# Patient Record
Sex: MALE | Race: WHITE | Employment: OTHER | URBAN - METROPOLITAN AREA
[De-identification: names, ages, dates, MRNs, and addresses within clinical notes are randomized per-mention and may not be internally consistent; named-entity substitution may affect disease eponyms.]

---

## 2017-02-13 ENCOUNTER — PATIENT MESSAGE (OUTPATIENT)
Dept: INTERNAL MEDICINE CLINIC | Facility: CLINIC | Age: 72
End: 2017-02-13

## 2017-02-13 RX ORDER — ESOMEPRAZOLE MAGNESIUM 40 MG/1
40 CAPSULE, DELAYED RELEASE ORAL
Qty: 90 CAPSULE | Refills: 3 | OUTPATIENT
Start: 2017-02-13

## 2017-02-14 NOTE — TELEPHONE ENCOUNTER
From: Autumn Freitas  To: Wallace Neff MD  Sent: 2/13/2017 9:54 PM CST  Subject: Prescription Question    As of January 1, 2017 I have a new prescription drug provider: [facility].  After calling them, I was told that in order to refill my prescrip

## 2017-02-15 ENCOUNTER — TELEPHONE (OUTPATIENT)
Dept: INTERNAL MEDICINE CLINIC | Facility: CLINIC | Age: 72
End: 2017-02-15

## 2017-02-15 RX ORDER — ESOMEPRAZOLE MAGNESIUM 40 MG/1
40 CAPSULE, DELAYED RELEASE ORAL
Qty: 90 CAPSULE | Refills: 3 | Status: SHIPPED | OUTPATIENT
Start: 2017-02-15 | End: 2018-01-22

## 2017-02-15 NOTE — TELEPHONE ENCOUNTER
Requests letter from Dr Maira Cordoba stating two tests - sleep study & pulmonary function  - were not mandatory   Patient is applying for life insurance & insurance company states this letter is needed to process his application   Wife Dalene Scheuermann can be reached a

## 2017-02-15 NOTE — TELEPHONE ENCOUNTER
From: Corrinne Pear  To: Soheila Willis MD  Sent: 2/14/2017 2:09 PM CST  Subject: Medication Renewal Request    Original authorizing provider: Ranny Dike, MD Corrinne Pear would like a refill of the following medications:  Amber José

## 2017-02-24 ENCOUNTER — TELEPHONE (OUTPATIENT)
Dept: INTERNAL MEDICINE CLINIC | Facility: CLINIC | Age: 72
End: 2017-02-24

## 2017-02-24 RX ORDER — PERINDOPRIL ERBUMINE 4 MG/1
4 TABLET ORAL DAILY
Qty: 90 TABLET | Refills: 3 | Status: SHIPPED | OUTPATIENT
Start: 2017-02-24 | End: 2018-01-22

## 2017-02-24 RX ORDER — ZOLPIDEM TARTRATE 10 MG/1
10 TABLET ORAL NIGHTLY
Qty: 90 TABLET | Refills: 3 | Status: SHIPPED
Start: 2017-02-24 | End: 2017-08-16

## 2017-02-24 RX ORDER — AMLODIPINE BESYLATE 10 MG/1
10 TABLET ORAL DAILY
Qty: 90 TABLET | Refills: 3 | Status: SHIPPED | OUTPATIENT
Start: 2017-02-24 | End: 2018-03-05

## 2017-02-24 NOTE — TELEPHONE ENCOUNTER
Pt called; letter completed and mailed to pt; refilled zolpidem 10 mg po qHS #90, 3RF; Rx FAXed to [address] [facility]

## 2017-02-24 NOTE — TELEPHONE ENCOUNTER
Patient's spouse asking for refills     Patient has changed insurance mail order and nds new scripts     Pt's chart has been changed     [facility] and is asking 90 day supply with one year of refills for     zolipidem 10mg one every night at

## 2017-02-24 NOTE — TELEPHONE ENCOUNTER
Daniela spouse calling asking for Dr. Cooper Holter to call her to discuss     Patient and a life insurance request - they are hoping doctor will call and discuss     They have received a call to ask doctor certain questions and pt states been waiting for a call

## 2017-02-24 NOTE — TELEPHONE ENCOUNTER
Amlodipine and Peridonpril Rx sent to [facility]  To Dr. Santos Cohen for Zolpidem Rx

## 2017-03-24 RX ORDER — AMLODIPINE BESYLATE 10 MG/1
10 TABLET ORAL DAILY
Qty: 90 TABLET | Refills: 3 | Status: CANCELLED | OUTPATIENT
Start: 2017-03-24

## 2017-03-24 RX ORDER — TAMSULOSIN HYDROCHLORIDE 0.4 MG/1
0.4 CAPSULE ORAL DAILY
Qty: 90 CAPSULE | Refills: 3 | Status: SHIPPED | OUTPATIENT
Start: 2017-03-24 | End: 2017-08-16

## 2017-03-24 NOTE — TELEPHONE ENCOUNTER
Tamsulosin refill sent. The Amlodipine refilled for 1 year 2/24/17 at [facility].

## 2017-03-24 NOTE — TELEPHONE ENCOUNTER
From: Sergei Fernandez  To: Dima Forte MD  Sent: 3/24/2017 11:37 AM CDT  Subject: Medication Renewal Request    Original authorizing provider: MD Sergei Henry would like a refill of the following medications:  tamsulo

## 2017-05-01 ENCOUNTER — TELEPHONE (OUTPATIENT)
Dept: INTERNAL MEDICINE CLINIC | Facility: CLINIC | Age: 72
End: 2017-05-01

## 2017-05-01 NOTE — TELEPHONE ENCOUNTER
Message received via [address]; d/w wife; PFTs mailed as requested    ---------------------------   ----- Message from Benson Level to Demetria Arana MD sent at 4/24/2017  9:13 AM -----   Govind Mccoy,     Would you be so kind as to ask Dr. Elisha Hahn t

## 2017-07-23 ENCOUNTER — PATIENT MESSAGE (OUTPATIENT)
Dept: INTERNAL MEDICINE CLINIC | Facility: CLINIC | Age: 72
End: 2017-07-23

## 2017-07-24 NOTE — TELEPHONE ENCOUNTER
From: Perfecto Frank  To: Irene Acosta MD  Sent: 7/23/2017 8:34 PM CDT  Subject: Emma Bui,    Sorry to bother you again, but I have a request. I am applying for a [address] from [employer] ([phone]

## 2017-08-16 ENCOUNTER — OFFICE VISIT (OUTPATIENT)
Dept: INTERNAL MEDICINE CLINIC | Facility: CLINIC | Age: 72
End: 2017-08-16

## 2017-08-16 VITALS
DIASTOLIC BLOOD PRESSURE: 80 MMHG | TEMPERATURE: 98 F | HEIGHT: 68 IN | OXYGEN SATURATION: 97 % | HEART RATE: 64 BPM | SYSTOLIC BLOOD PRESSURE: 130 MMHG | WEIGHT: 220 LBS | BODY MASS INDEX: 33.34 KG/M2

## 2017-08-16 DIAGNOSIS — R06.00 DYSPNEA, UNSPECIFIED TYPE: Primary | ICD-10-CM

## 2017-08-16 DIAGNOSIS — K21.9 GASTROESOPHAGEAL REFLUX DISEASE WITHOUT ESOPHAGITIS: ICD-10-CM

## 2017-08-16 DIAGNOSIS — I10 ESSENTIAL HYPERTENSION WITH GOAL BLOOD PRESSURE LESS THAN 140/90: ICD-10-CM

## 2017-08-16 PROCEDURE — 99214 OFFICE O/P EST MOD 30 MIN: CPT | Performed by: INTERNAL MEDICINE

## 2017-08-16 PROCEDURE — G0463 HOSPITAL OUTPT CLINIC VISIT: HCPCS | Performed by: INTERNAL MEDICINE

## 2017-08-16 RX ORDER — ALBUTEROL SULFATE 90 UG/1
2 AEROSOL, METERED RESPIRATORY (INHALATION) EVERY 6 HOURS PRN
Qty: 3 INHALER | Refills: 3 | Status: SHIPPED | OUTPATIENT
Start: 2017-08-16 | End: 2018-03-05

## 2017-08-16 RX ORDER — BUDESONIDE AND FORMOTEROL FUMARATE DIHYDRATE 80; 4.5 UG/1; UG/1
2 AEROSOL RESPIRATORY (INHALATION) 2 TIMES DAILY
Qty: 3 INHALER | Refills: 3 | Status: SHIPPED | OUTPATIENT
Start: 2017-08-16 | End: 2018-03-05

## 2017-08-16 NOTE — PROGRESS NOTES
Larry Gallegos is a 71year old male. HPI:   Patient presents with:   Follow - Up: brought blood work from August,  Shortness Of Breath: with exertion - for about 3-4 months      70 y/o M with COPD based on PFTs showing impaired small airway disease Oral Tab Take 1 tablet (10 mg total) by mouth daily as needed for Erectile Dysfunction. Disp: 24 tablet Rfl: 3   ibuprofen (MOTRIN) 200 MG Oral Tab Take 200 mg by mouth every 6 (six) hours as needed for Pain.  Disp:  Rfl:    Multiple Vitamins-Minerals (MENS somnolence  +snoring; +unrefreshing sleep; +daytime hypersomnolence; sleep study ordered, though npt pursued by pt to date     PSA screening  Pt had elevated PSA of 5.7 in April 2015, and repeat PSA in Oct 2015 was 3.4; repeat PSA 2.54 in Nov 2016; rep

## 2017-12-11 RX ORDER — AMLODIPINE BESYLATE 10 MG/1
TABLET ORAL
Qty: 90 TABLET | Refills: 3 | OUTPATIENT
Start: 2017-12-11

## 2017-12-11 NOTE — TELEPHONE ENCOUNTER
Current refill request refused due to refill is either a duplicate request or has active refills at the pharmacy. Check previous templates. Refill request for mail order too early. Last refilled 2/24/17 for year supply.  This request is more than 8 week

## 2018-01-23 NOTE — TELEPHONE ENCOUNTER
[facility] faxed refill request for Perindopril 4 mg tabs and also for Esomepra Mag 40 mg caps  Marked as duplicate.

## 2018-01-25 RX ORDER — ESOMEPRAZOLE MAGNESIUM 40 MG/1
40 CAPSULE, DELAYED RELEASE ORAL
Qty: 90 CAPSULE | Refills: 3 | Status: SHIPPED | OUTPATIENT
Start: 2018-01-25 | End: 2019-01-20

## 2018-01-25 RX ORDER — PERINDOPRIL ERBUMINE 4 MG/1
4 TABLET ORAL DAILY
Qty: 90 TABLET | Refills: 3 | Status: SHIPPED | OUTPATIENT
Start: 2018-01-25 | End: 2019-01-20

## 2018-01-31 ENCOUNTER — TELEPHONE (OUTPATIENT)
Dept: INTERNAL MEDICINE CLINIC | Facility: CLINIC | Age: 73
End: 2018-01-31

## 2018-01-31 DIAGNOSIS — Z00.00 ANNUAL PHYSICAL EXAM: Primary | ICD-10-CM

## 2018-01-31 DIAGNOSIS — Z00.00 ENCOUNTER FOR MEDICARE ANNUAL WELLNESS EXAM: ICD-10-CM

## 2018-01-31 DIAGNOSIS — Z12.5 PROSTATE CANCER SCREENING: ICD-10-CM

## 2018-01-31 NOTE — TELEPHONE ENCOUNTER
Pt. Needs orders for labs, include PSA please sent to [facility] in [facility]   Ph. # [phone]  Fax. # [phone]  Pt.  Not sure of fax number  Routed to clinical

## 2018-02-01 NOTE — TELEPHONE ENCOUNTER
LMOVM per HIPAA notifying patient/wife of fasting labs that were faxed to [address] [place] [address] as requested and also mailed to their home. Instructed to fast 12 hours prior to labs and to call back with questions.

## 2018-02-01 NOTE — TELEPHONE ENCOUNTER
Fasting labs ordered, FAXed (verified FAX # [phone]), and original order mailed to pt (done); please call pt

## 2018-03-05 ENCOUNTER — TELEPHONE (OUTPATIENT)
Dept: INTERNAL MEDICINE CLINIC | Facility: CLINIC | Age: 73
End: 2018-03-05

## 2018-03-05 ENCOUNTER — OFFICE VISIT (OUTPATIENT)
Dept: INTERNAL MEDICINE CLINIC | Facility: CLINIC | Age: 73
End: 2018-03-05

## 2018-03-05 VITALS
BODY MASS INDEX: 32.14 KG/M2 | DIASTOLIC BLOOD PRESSURE: 74 MMHG | SYSTOLIC BLOOD PRESSURE: 146 MMHG | HEART RATE: 64 BPM | WEIGHT: 217 LBS | HEIGHT: 69 IN | TEMPERATURE: 95 F

## 2018-03-05 DIAGNOSIS — Z00.00 PHYSICAL EXAM, ANNUAL: Primary | ICD-10-CM

## 2018-03-05 DIAGNOSIS — Z80.0 FAMILY HISTORY OF COLON CANCER: ICD-10-CM

## 2018-03-05 DIAGNOSIS — K21.9 GASTROESOPHAGEAL REFLUX DISEASE WITHOUT ESOPHAGITIS: ICD-10-CM

## 2018-03-05 DIAGNOSIS — K57.90 DIVERTICULOSIS OF INTESTINE WITHOUT BLEEDING, UNSPECIFIED INTESTINAL TRACT LOCATION: ICD-10-CM

## 2018-03-05 DIAGNOSIS — N52.9 ERECTILE DYSFUNCTION, UNSPECIFIED ERECTILE DYSFUNCTION TYPE: ICD-10-CM

## 2018-03-05 DIAGNOSIS — N40.0 PROSTATISM: ICD-10-CM

## 2018-03-05 DIAGNOSIS — R06.00 DYSPNEA, UNSPECIFIED TYPE: ICD-10-CM

## 2018-03-05 DIAGNOSIS — M25.551 ARTHRALGIA OF RIGHT HIP: ICD-10-CM

## 2018-03-05 DIAGNOSIS — I10 ESSENTIAL HYPERTENSION WITH GOAL BLOOD PRESSURE LESS THAN 140/90: ICD-10-CM

## 2018-03-05 DIAGNOSIS — G47.00 INSOMNIA, UNSPECIFIED TYPE: ICD-10-CM

## 2018-03-05 DIAGNOSIS — G47.33 OSA (OBSTRUCTIVE SLEEP APNEA): ICD-10-CM

## 2018-03-05 DIAGNOSIS — M81.0 OSTEOPOROSIS OF LUMBAR SPINE: ICD-10-CM

## 2018-03-05 DIAGNOSIS — Z12.5 PROSTATE CANCER SCREENING: ICD-10-CM

## 2018-03-05 PROCEDURE — G0439 PPPS, SUBSEQ VISIT: HCPCS | Performed by: INTERNAL MEDICINE

## 2018-03-05 RX ORDER — AMLODIPINE BESYLATE 10 MG/1
10 TABLET ORAL DAILY
Qty: 90 TABLET | Refills: 3 | Status: SHIPPED | OUTPATIENT
Start: 2018-03-05 | End: 2019-02-07

## 2018-03-05 RX ORDER — TADALAFIL 10 MG/1
10 TABLET ORAL
Qty: 24 TABLET | Refills: 3 | Status: SHIPPED | OUTPATIENT
Start: 2018-03-05 | End: 2019-02-07

## 2018-03-05 RX ORDER — TADALAFIL 10 MG/1
10 TABLET ORAL
Qty: 24 TABLET | Refills: 3 | Status: SHIPPED | OUTPATIENT
Start: 2018-03-05 | End: 2018-03-05

## 2018-03-05 RX ORDER — AMLODIPINE BESYLATE 10 MG/1
10 TABLET ORAL DAILY
Qty: 90 TABLET | Refills: 3 | Status: SHIPPED | OUTPATIENT
Start: 2018-03-05 | End: 2018-03-05

## 2018-03-05 RX ORDER — ALFUZOSIN HYDROCHLORIDE 10 MG/1
10 TABLET, EXTENDED RELEASE ORAL DAILY
Qty: 30 TABLET | Refills: 5 | Status: SHIPPED | OUTPATIENT
Start: 2018-03-05 | End: 2018-10-02

## 2018-03-05 NOTE — PROGRESS NOTES
Joce Citijustin is a 71year old male.     HPI:   Patient presents with:  Physical  Hip Pain  Nocturia      72 y/o M here for subsequent Medicare annual wellness exam; reports nocturia x4; c/o right hip pain with +weight-bearing;  no CP; + SOB with exer mouth every morning before breakfast. Disp: 90 capsule Rfl: 3   Perindopril Erbumine (ACEON) 4 MG Oral Tab Take 1 tablet (4 mg total) by mouth daily.  Disp: 90 tablet Rfl: 3   ibuprofen (MOTRIN) 200 MG Oral Tab Take 200 mg by mouth every 6 (six) hours as ne 0-No    Are you on multiple medications?: 1-Yes    Does pain affect your day to day activities?: 1-Yes     Have you had any memory issues?: 0-No    Fall/Risk Scorin          Depression Screening (PHQ-2/PHQ-9): Over the LAST 2 WEEKS applicable    Pneumococcal No orders found for this or any previous visit. Update Immunization Activity if applicable    Hepatitis B No orders found for this or any previous visit.  Update Immunization Activity if applicable    Tetanus   Orders placed or pe decreased appetite, diarrhea and vomiting; no melena or hematochezia  Musculoskeletal:  Negative for arthralgias or myalgias  Genitourinary:  Negative for dysuria or polyuria  Hema/Lymph:  Negative for easy bleeding and easy bruising  Integumentary:  Negat Yolanda Grandazam      CONCEPCIÓN  +snoring; +unrefreshing sleep; +daytime hypersomnolence; sleep study ordered, though npt pursued by pt to date     PSA screening  Pt had elevated PSA of 5.7 in April 2015, and repeat PSA in Oct 2015 was 3.4; repeat PSA 2.54 in Nov 2016;

## 2018-03-08 NOTE — TELEPHONE ENCOUNTER
jennyfer sent    Ray,  Your insurance does not cover Cialis 10 mg. In 2016, we did a prior authorization with [facility], however it appears your insurance has changed. Please contact your insurance for information regarding formulary coverage or alternatives.

## 2018-03-09 RX ORDER — AMLODIPINE BESYLATE 10 MG/1
10 TABLET ORAL DAILY
Qty: 90 TABLET | Refills: 3 | OUTPATIENT
Start: 2018-03-09

## 2018-04-18 RX ORDER — ALFUZOSIN HYDROCHLORIDE 10 MG/1
10 TABLET, EXTENDED RELEASE ORAL DAILY
Qty: 30 TABLET | Refills: 5
Start: 2018-04-18

## 2018-04-18 NOTE — TELEPHONE ENCOUNTER
From: Tendr Payment  Sent: 4/18/2018 12:20 PM CDT  Subject: Medication Renewal Request    [employer] would like a refill of the following medications:     Alfuzosin HCl ER 10 MG Oral Tablet 24 Hr Vero Collazo MD]   Patient Comment:  Wo

## 2018-10-05 RX ORDER — ALFUZOSIN HYDROCHLORIDE 10 MG/1
10 TABLET, EXTENDED RELEASE ORAL DAILY
Qty: 90 TABLET | Refills: 3 | Status: SHIPPED | OUTPATIENT
Start: 2018-10-05 | End: 2019-02-07

## 2018-10-05 NOTE — TELEPHONE ENCOUNTER
Cate fisher for clinical staff to keep refilling Alfuzosin for patient?  It was started at last office visit on a trial basis

## 2019-01-20 RX ORDER — PERINDOPRIL ERBUMINE 4 MG/1
TABLET ORAL
Qty: 90 TABLET | Refills: 0 | Status: SHIPPED | OUTPATIENT
Start: 2019-01-20 | End: 2019-02-07

## 2019-01-20 RX ORDER — ESOMEPRAZOLE MAGNESIUM 40 MG/1
CAPSULE, DELAYED RELEASE ORAL
Qty: 90 CAPSULE | Refills: 0 | Status: SHIPPED | OUTPATIENT
Start: 2019-01-20 | End: 2019-02-07

## 2019-01-21 NOTE — TELEPHONE ENCOUNTER
Refills given for 90 days however Dr Edith Rey had asked pt to return in September of 2018 please call and get pt scheduled.  Thank you

## 2019-02-07 ENCOUNTER — OFFICE VISIT (OUTPATIENT)
Dept: INTERNAL MEDICINE CLINIC | Facility: CLINIC | Age: 74
End: 2019-02-07
Payer: MEDICARE

## 2019-02-07 VITALS
TEMPERATURE: 98 F | WEIGHT: 220.38 LBS | HEART RATE: 68 BPM | BODY MASS INDEX: 34.59 KG/M2 | DIASTOLIC BLOOD PRESSURE: 80 MMHG | SYSTOLIC BLOOD PRESSURE: 134 MMHG | HEIGHT: 67 IN

## 2019-02-07 DIAGNOSIS — Z12.5 PROSTATE CANCER SCREENING: ICD-10-CM

## 2019-02-07 DIAGNOSIS — Z80.0 FAMILY HISTORY OF COLON CANCER: ICD-10-CM

## 2019-02-07 DIAGNOSIS — I10 ESSENTIAL HYPERTENSION WITH GOAL BLOOD PRESSURE LESS THAN 140/90: ICD-10-CM

## 2019-02-07 DIAGNOSIS — R06.00 DYSPNEA, UNSPECIFIED TYPE: ICD-10-CM

## 2019-02-07 DIAGNOSIS — K57.90 DIVERTICULOSIS OF INTESTINE WITHOUT BLEEDING, UNSPECIFIED INTESTINAL TRACT LOCATION: ICD-10-CM

## 2019-02-07 DIAGNOSIS — G47.33 OSA (OBSTRUCTIVE SLEEP APNEA): ICD-10-CM

## 2019-02-07 DIAGNOSIS — K21.9 GASTROESOPHAGEAL REFLUX DISEASE WITHOUT ESOPHAGITIS: ICD-10-CM

## 2019-02-07 DIAGNOSIS — N52.9 ERECTILE DYSFUNCTION, UNSPECIFIED ERECTILE DYSFUNCTION TYPE: ICD-10-CM

## 2019-02-07 DIAGNOSIS — M47.816 SPONDYLOSIS OF LUMBAR REGION WITHOUT MYELOPATHY OR RADICULOPATHY: ICD-10-CM

## 2019-02-07 DIAGNOSIS — G47.00 INSOMNIA, UNSPECIFIED TYPE: ICD-10-CM

## 2019-02-07 DIAGNOSIS — M16.11 PRIMARY OSTEOARTHRITIS OF RIGHT HIP: ICD-10-CM

## 2019-02-07 DIAGNOSIS — Z00.00 PHYSICAL EXAM, ANNUAL: Primary | ICD-10-CM

## 2019-02-07 PROCEDURE — 99214 OFFICE O/P EST MOD 30 MIN: CPT | Performed by: INTERNAL MEDICINE

## 2019-02-07 PROCEDURE — G0463 HOSPITAL OUTPT CLINIC VISIT: HCPCS | Performed by: INTERNAL MEDICINE

## 2019-02-07 RX ORDER — TADALAFIL 10 MG/1
10 TABLET ORAL
Qty: 24 TABLET | Refills: 3 | Status: SHIPPED | OUTPATIENT
Start: 2019-02-07 | End: 2019-07-22

## 2019-02-07 RX ORDER — PERINDOPRIL ERBUMINE 4 MG/1
4 TABLET ORAL
Qty: 90 TABLET | Refills: 3 | Status: SHIPPED | OUTPATIENT
Start: 2019-02-07 | End: 2020-02-12

## 2019-02-07 RX ORDER — ESOMEPRAZOLE MAGNESIUM 40 MG/1
CAPSULE, DELAYED RELEASE ORAL
Qty: 90 CAPSULE | Refills: 3 | Status: SHIPPED | OUTPATIENT
Start: 2019-02-07 | End: 2020-02-12

## 2019-02-07 RX ORDER — ALFUZOSIN HYDROCHLORIDE 10 MG/1
10 TABLET, EXTENDED RELEASE ORAL DAILY
Qty: 90 TABLET | Refills: 3 | Status: SHIPPED | OUTPATIENT
Start: 2019-02-07 | End: 2020-01-11

## 2019-02-07 RX ORDER — AMLODIPINE BESYLATE 10 MG/1
10 TABLET ORAL DAILY
Qty: 90 TABLET | Refills: 3 | Status: SHIPPED | OUTPATIENT
Start: 2019-02-07 | End: 2020-02-12

## 2019-02-07 NOTE — PROGRESS NOTES
Zeus Guerrero is a 68year old male.     HPI:   Patient presents with:  Physical: medicare annual wellness visit  Medication Request      69 y/o M here for subsequent Medicare annual wellness exam; has +low back pain with prolonged activity; also has tablet Rfl: 3   Alfuzosin HCl ER 10 MG Oral Tablet 24 Hr Take 1 tablet (10 mg total) by mouth daily. Disp: 90 tablet Rfl: 3   AmLODIPine Besylate 10 MG Oral Tab Take 1 tablet (10 mg total) by mouth daily.  Disp: 90 tablet Rfl: 3   Tadalafil (CIALIS) 10 MG O 3 or more medical conditions?: 0-No    Have you fallen in the last 12 months?: 0-No    Do you accidently lose urine?: 0-No    Do you have difficulty seeing?: 0-No    Do you have any difficulty walking or getting up?: 0-No    Do you have any tripping hazard (mg/dL)   Date Value   04/13/2015 91        EKG One Time     Colorectal Cancer Screening      Colonoscopy Screen every 10 years Colonoscopy due on 04/21/2018 Update Health Maintenance if applicable    Flex Sigmoidoscopy Screen every 5 years No results foun No flowsheet data found. Dilated Eye exam  Annually No flowsheet data found. No flowsheet data found. COPD      Spirometry Testing Annually No results found for this or any previous visit. No flowsheet data found.             ROS:   Constitutiona exam        Dyspnea likely due to COPD    +SOB x 6 months; +COOLEY after walking up stairs; CXR neg infiltrate or mass;  PFTs in April 2017 show normal FEV1 on 3.00 L, though small airways decreased (as low as 59%) from predicted with >15% bronchodilator resp prn     Diverticulosis  Colonoscopy in March 2016 by Dr Nora Finch          RTC 1 year                  Orders This Visit:  No orders of the defined types were placed in this encounter.       Meds This Visit:  Requested Prescriptions     Signed Prescriptions

## 2019-02-08 ENCOUNTER — TELEPHONE (OUTPATIENT)
Dept: INTERNAL MEDICINE CLINIC | Facility: CLINIC | Age: 74
End: 2019-02-08

## 2019-02-08 NOTE — TELEPHONE ENCOUNTER
[facility] faxed a [place] request for Tadalafil  Ins. # [id number]  PH.  # [phone]   Routed to Rx Placed in [address] [place] [address] folder

## 2019-03-01 ENCOUNTER — TELEPHONE (OUTPATIENT)
Dept: INTERNAL MEDICINE CLINIC | Facility: CLINIC | Age: 74
End: 2019-03-01

## 2019-03-01 ENCOUNTER — HOSPITAL ENCOUNTER (OUTPATIENT)
Dept: GENERAL RADIOLOGY | Facility: HOSPITAL | Age: 74
Discharge: HOME | End: 2019-03-01
Attending: ORTHOPAEDIC SURGERY
Payer: MEDICARE

## 2019-03-01 DIAGNOSIS — M25.551 RIGHT HIP PAIN: ICD-10-CM

## 2019-03-01 DIAGNOSIS — M16.11 PRIMARY OSTEOARTHRITIS OF RIGHT HIP: Primary | ICD-10-CM

## 2019-03-01 DIAGNOSIS — Z01.818 PREOP EXAMINATION: ICD-10-CM

## 2019-03-01 DIAGNOSIS — D68.9 COAGULOPATHY (HCC): ICD-10-CM

## 2019-03-01 DIAGNOSIS — I10 BENIGN HYPERTENSION: ICD-10-CM

## 2019-03-01 PROCEDURE — 73502 X-RAY EXAM HIP UNI 2-3 VIEWS: CPT | Performed by: ORTHOPAEDIC SURGERY

## 2019-03-01 NOTE — TELEPHONE ENCOUNTER
Patient is scheduled for [facility] Lakeshia Awan. Pre-op paperwork placed on Dr. Vera Arias desk. To  to call and schedule pre-op visit within 30days.

## 2019-03-01 NOTE — TELEPHONE ENCOUNTER
Spoke to pt he is scheduled for surgery on 3/13, the first opening is 3/8 pt doesn't feel that will be enough time to get the testing done  Pt is asking if he can be added early next week, pt can be reached at [phone]   Tasked to nursing

## 2019-03-04 NOTE — TELEPHONE ENCOUNTER
I reviewed orders that orthopedic surgeon, Dr Sheri Chu, wants to have completed by surgical date on 3/13/19; pre-op testing (labs and EKG) ordered; pt may have testing done now; all ordered (CBC, CMP, PT/INR, MRSA swab, UA with culture reflex and EKG

## 2019-03-05 ENCOUNTER — LAB ENCOUNTER (OUTPATIENT)
Dept: LAB | Facility: HOSPITAL | Age: 74
End: 2019-03-05
Attending: INTERNAL MEDICINE
Payer: MEDICARE

## 2019-03-05 DIAGNOSIS — Z01.818 PREOP EXAMINATION: ICD-10-CM

## 2019-03-05 DIAGNOSIS — M16.11 PRIMARY OSTEOARTHRITIS OF RIGHT HIP: ICD-10-CM

## 2019-03-05 DIAGNOSIS — I10 ESSENTIAL HYPERTENSION, MALIGNANT: Primary | ICD-10-CM

## 2019-03-05 DIAGNOSIS — I10 BENIGN HYPERTENSION: ICD-10-CM

## 2019-03-05 DIAGNOSIS — Z12.5 SPECIAL SCREENING FOR MALIGNANT NEOPLASM OF PROSTATE: ICD-10-CM

## 2019-03-05 DIAGNOSIS — D68.9 COAGULOPATHY (HCC): ICD-10-CM

## 2019-03-05 LAB
ALBUMIN SERPL-MCNC: 3.9 G/DL (ref 3.4–5)
ALBUMIN/GLOB SERPL: 1.1 {RATIO} (ref 1–2)
ALP LIVER SERPL-CCNC: 77 U/L (ref 45–117)
ALT SERPL-CCNC: 37 U/L (ref 16–61)
ANION GAP SERPL CALC-SCNC: 5 MMOL/L (ref 0–18)
AST SERPL-CCNC: 25 U/L (ref 15–37)
BASOPHILS # BLD AUTO: 0.07 X10(3) UL (ref 0–0.2)
BASOPHILS NFR BLD AUTO: 0.9 %
BILIRUB SERPL-MCNC: 0.9 MG/DL (ref 0.1–2)
BILIRUB UR QL: NEGATIVE
BUN BLD-MCNC: 18 MG/DL (ref 7–18)
BUN/CREAT SERPL: 18.8 (ref 10–20)
CALCIUM BLD-MCNC: 8.4 MG/DL (ref 8.5–10.1)
CHLORIDE SERPL-SCNC: 109 MMOL/L (ref 98–107)
CHOLEST SMN-MCNC: 175 MG/DL (ref ?–200)
CLARITY UR: CLEAR
CO2 SERPL-SCNC: 27 MMOL/L (ref 21–32)
COLOR UR: YELLOW
CREAT BLD-MCNC: 0.96 MG/DL (ref 0.7–1.3)
DEPRECATED RDW RBC AUTO: 38.6 FL (ref 35.1–46.3)
EOSINOPHIL # BLD AUTO: 0.16 X10(3) UL (ref 0–0.7)
EOSINOPHIL NFR BLD AUTO: 2 %
ERYTHROCYTE [DISTWIDTH] IN BLOOD BY AUTOMATED COUNT: 11.3 % (ref 11–15)
GLOBULIN PLAS-MCNC: 3.4 G/DL (ref 2.8–4.4)
GLUCOSE BLD-MCNC: 95 MG/DL (ref 70–99)
GLUCOSE UR-MCNC: NEGATIVE MG/DL
HCT VFR BLD AUTO: 42.6 % (ref 39–53)
HDLC SERPL-MCNC: 42 MG/DL (ref 40–59)
HGB BLD-MCNC: 14.8 G/DL (ref 13–17.5)
HGB UR QL STRIP.AUTO: NEGATIVE
IMM GRANULOCYTES # BLD AUTO: 0.02 X10(3) UL (ref 0–1)
IMM GRANULOCYTES NFR BLD: 0.3 %
INR BLD: 0.97 (ref 0.9–1.2)
KETONES UR-MCNC: NEGATIVE MG/DL
LDLC SERPL CALC-MCNC: 109 MG/DL (ref ?–100)
LEUKOCYTE ESTERASE UR QL STRIP.AUTO: NEGATIVE
LYMPHOCYTES # BLD AUTO: 2.06 X10(3) UL (ref 1–4)
LYMPHOCYTES NFR BLD AUTO: 26.1 %
M PROTEIN MFR SERPL ELPH: 7.3 G/DL (ref 6.4–8.2)
MCH RBC QN AUTO: 32.7 PG (ref 26–34)
MCHC RBC AUTO-ENTMCNC: 34.7 G/DL (ref 31–37)
MCV RBC AUTO: 94 FL (ref 80–100)
MONOCYTES # BLD AUTO: 0.62 X10(3) UL (ref 0.1–1)
MONOCYTES NFR BLD AUTO: 7.9 %
MRSA DNA SPEC QL NAA+PROBE: NEGATIVE
NEUTROPHILS # BLD AUTO: 4.95 X10 (3) UL (ref 1.5–7.7)
NEUTROPHILS # BLD AUTO: 4.95 X10(3) UL (ref 1.5–7.7)
NEUTROPHILS NFR BLD AUTO: 62.8 %
NITRITE UR QL STRIP.AUTO: NEGATIVE
NONHDLC SERPL-MCNC: 133 MG/DL (ref ?–130)
OSMOLALITY SERPL CALC.SUM OF ELEC: 294 MOSM/KG (ref 275–295)
PH UR: 7 [PH] (ref 5–8)
PLATELET # BLD AUTO: 221 10(3)UL (ref 150–450)
POTASSIUM SERPL-SCNC: 3.8 MMOL/L (ref 3.5–5.1)
PROT UR-MCNC: NEGATIVE MG/DL
PROTHROMBIN TIME: 12.7 SECONDS (ref 11.8–14.5)
PSA SERPL-MCNC: 4.71 NG/ML (ref ?–4)
RBC # BLD AUTO: 4.53 X10(6)UL (ref 3.8–5.8)
SODIUM SERPL-SCNC: 141 MMOL/L (ref 136–145)
SP GR UR STRIP: 1.02 (ref 1–1.03)
TRIGL SERPL-MCNC: 121 MG/DL (ref 30–149)
TSI SER-ACNC: 1.39 MIU/ML (ref 0.36–3.74)
UROBILINOGEN UR STRIP-ACNC: <2
VIT C UR-MCNC: NEGATIVE MG/DL
VLDLC SERPL CALC-MCNC: 24 MG/DL (ref 0–30)
WBC # BLD AUTO: 7.9 X10(3) UL (ref 4–11)

## 2019-03-05 PROCEDURE — 80061 LIPID PANEL: CPT

## 2019-03-05 PROCEDURE — 36415 COLL VENOUS BLD VENIPUNCTURE: CPT

## 2019-03-05 PROCEDURE — 85610 PROTHROMBIN TIME: CPT

## 2019-03-05 PROCEDURE — 84153 ASSAY OF PSA TOTAL: CPT

## 2019-03-05 PROCEDURE — 81003 URINALYSIS AUTO W/O SCOPE: CPT | Performed by: INTERNAL MEDICINE

## 2019-03-05 PROCEDURE — 93005 ELECTROCARDIOGRAM TRACING: CPT

## 2019-03-05 PROCEDURE — 85025 COMPLETE CBC W/AUTO DIFF WBC: CPT

## 2019-03-05 PROCEDURE — 87641 MR-STAPH DNA AMP PROBE: CPT

## 2019-03-05 PROCEDURE — 80053 COMPREHEN METABOLIC PANEL: CPT

## 2019-03-05 PROCEDURE — 93010 ELECTROCARDIOGRAM REPORT: CPT | Performed by: INTERNAL MEDICINE

## 2019-03-05 PROCEDURE — 84443 ASSAY THYROID STIM HORMONE: CPT

## 2019-03-08 ENCOUNTER — TELEPHONE (OUTPATIENT)
Dept: INTERNAL MEDICINE CLINIC | Facility: CLINIC | Age: 74
End: 2019-03-08

## 2019-03-08 NOTE — TELEPHONE ENCOUNTER
Pt awaits right hip total arthroplasty on 3/13/19 under care of orthopedics, Dr Kurtis Barkley; he has no history of coronary artery disease; EKG on 3/5/19 shows sinus bradycardia; there are no ischemic abnormalities; he has a good exercise tolerance and i

## 2019-03-08 NOTE — TELEPHONE ENCOUNTER
Pt spouse called  Dr Dilma Dillon office requesting letter of medical clearance for pt surgery scheduled for 3/13/19 at [facility]  Pt hoping that this can happen this morning  Tasked to nursing

## 2019-03-26 ENCOUNTER — PATIENT MESSAGE (OUTPATIENT)
Dept: INTERNAL MEDICINE CLINIC | Facility: CLINIC | Age: 74
End: 2019-03-26

## 2019-03-26 DIAGNOSIS — R97.20 ELEVATED PSA: Primary | ICD-10-CM

## 2019-03-27 NOTE — TELEPHONE ENCOUNTER
From: Mike Grant  To: Hira Ackerman MD  Sent: 3/26/2019 10:23 AM CDT  Subject: Non-Urgent [facility] O[facility]    Just wanted you to know so you can update my file, that I successfully underwent an anterior right hip replacemen

## 2019-05-02 NOTE — TELEPHONE ENCOUNTER
Imp- elevated PSA; PSA 4.71 in March 2019; rec- repeat PSA in June 2019 (ordered); if increases further, would recommend urology assessment

## 2019-05-06 ENCOUNTER — OFFICE VISIT (OUTPATIENT)
Dept: NEUROLOGY | Facility: CLINIC | Age: 74
End: 2019-05-06
Payer: MEDICARE

## 2019-05-06 VITALS
HEART RATE: 68 BPM | DIASTOLIC BLOOD PRESSURE: 78 MMHG | HEIGHT: 69 IN | WEIGHT: 220 LBS | RESPIRATION RATE: 16 BRPM | SYSTOLIC BLOOD PRESSURE: 124 MMHG | BODY MASS INDEX: 32.58 KG/M2

## 2019-05-06 DIAGNOSIS — G47.00 INSOMNIA, UNSPECIFIED TYPE: ICD-10-CM

## 2019-05-06 DIAGNOSIS — K21.9 GASTROESOPHAGEAL REFLUX DISEASE, ESOPHAGITIS PRESENCE NOT SPECIFIED: ICD-10-CM

## 2019-05-06 DIAGNOSIS — E66.3 PATIENT OVERWEIGHT: ICD-10-CM

## 2019-05-06 DIAGNOSIS — Z96.641 HISTORY OF TOTAL RIGHT HIP REPLACEMENT: ICD-10-CM

## 2019-05-06 DIAGNOSIS — M47.816 LUMBAR SPONDYLOSIS: Primary | ICD-10-CM

## 2019-05-06 PROCEDURE — 99204 OFFICE O/P NEW MOD 45 MIN: CPT | Performed by: PHYSICAL MEDICINE & REHABILITATION

## 2019-05-06 RX ORDER — ACETAMINOPHEN 500 MG
500 TABLET ORAL EVERY 6 HOURS PRN
COMMUNITY

## 2019-05-06 NOTE — PROGRESS NOTES
[address]  Progress Note    CHIEF COMPLAINT:  Patient presents with:  Low Back Pain: Patient presents for low back pain for the past years, has worsned over the past 2 months ago since having a hip replacem Smoking status: Former Smoker        Years: 30.00        Types: Cigarettes        Quit date: 2000        Years since quittin.3      Smokeless tobacco: Never Used    Substance and Sexual Activity      Alcohol use: Yes        Comment: wine, 1 glass denies  Pelvic Pain: denies  Painful Urination: denies   Musculoskeletal  Joint Stiffness: admits  Painful Joints: admits  Tailbone Pain: denies  Swollen Joints: denies   Peripheral Vascular  Swelling of Legs/Feet: denies  Cold Extremities: admits(Feet) injection at this point. 2. History of total right hip replacement  3 months ago, still with some precautions. Tells me no NSAIDs allowed.     3. Gastroesophageal reflux disease, esophagitis presence not specified  NSAIDs relatively contraindicated due t

## 2019-05-17 ENCOUNTER — APPOINTMENT (OUTPATIENT)
Dept: LAB | Facility: HOSPITAL | Age: 74
End: 2019-05-17
Attending: ORTHOPAEDIC SURGERY
Payer: MEDICARE

## 2019-05-17 ENCOUNTER — HOSPITAL ENCOUNTER (OUTPATIENT)
Dept: GENERAL RADIOLOGY | Facility: HOSPITAL | Age: 74
Discharge: HOME | End: 2019-05-17
Attending: ORTHOPAEDIC SURGERY
Payer: MEDICARE

## 2019-05-17 DIAGNOSIS — R97.20 ELEVATED PSA: ICD-10-CM

## 2019-05-17 DIAGNOSIS — Z96.641 HISTORY OF RIGHT HIP REPLACEMENT: ICD-10-CM

## 2019-05-17 PROCEDURE — 36415 COLL VENOUS BLD VENIPUNCTURE: CPT

## 2019-05-17 PROCEDURE — 84153 ASSAY OF PSA TOTAL: CPT

## 2019-05-17 PROCEDURE — 73502 X-RAY EXAM HIP UNI 2-3 VIEWS: CPT | Performed by: ORTHOPAEDIC SURGERY

## 2019-05-31 ENCOUNTER — TELEPHONE (OUTPATIENT)
Dept: NEUROLOGY | Facility: CLINIC | Age: 74
End: 2019-05-31

## 2019-05-31 ENCOUNTER — OFFICE VISIT (OUTPATIENT)
Dept: NEUROLOGY | Facility: CLINIC | Age: 74
End: 2019-05-31
Payer: MEDICARE

## 2019-05-31 VITALS
RESPIRATION RATE: 20 BRPM | HEIGHT: 69 IN | WEIGHT: 220 LBS | HEART RATE: 68 BPM | BODY MASS INDEX: 32.58 KG/M2 | SYSTOLIC BLOOD PRESSURE: 140 MMHG | DIASTOLIC BLOOD PRESSURE: 72 MMHG

## 2019-05-31 DIAGNOSIS — Z96.641 HISTORY OF TOTAL RIGHT HIP REPLACEMENT: ICD-10-CM

## 2019-05-31 DIAGNOSIS — M47.816 LUMBAR SPONDYLOSIS: Primary | ICD-10-CM

## 2019-05-31 DIAGNOSIS — G47.00 INSOMNIA, UNSPECIFIED TYPE: ICD-10-CM

## 2019-05-31 DIAGNOSIS — K21.9 GASTROESOPHAGEAL REFLUX DISEASE, ESOPHAGITIS PRESENCE NOT SPECIFIED: ICD-10-CM

## 2019-05-31 PROCEDURE — 99213 OFFICE O/P EST LOW 20 MIN: CPT | Performed by: PHYSICAL MEDICINE & REHABILITATION

## 2019-05-31 RX ORDER — IBUPROFEN 200 MG
200 TABLET ORAL AS NEEDED
COMMUNITY

## 2019-05-31 NOTE — PROGRESS NOTES
[address]  Progress Note    CHIEF COMPLAINT:  Patient presents with:  Low Back Pain: Patient presents for follow up on low back pain, LOV:5/6/19.  Patient has been doing PT at [facility], states it has status: Former Smoker        Years: 30.00        Types: Cigarettes        Quit date: 2000        Years since quittin.4      Smokeless tobacco: Never Used    Substance and Sexual Activity      Alcohol use: Yes        Comment: wine, 1 glass daily Pertinent positives and negatives noted in the HPI. PHYSICAL EXAM:   /72 (BP Location: Right arm, Patient Position: Sitting, Cuff Size: adult)   Pulse 68   Resp 20   Ht 69\"   Wt 220 lb   BMI 32.49 kg/m²     Body mass index is 32.49 kg/m². agreement with the assessment and plan. All questions were answered. There were no barriers to learning.         Phylicia Willams MD  Physical Medicine and Rehabilitation/Sports Medicine  MEDICAL CENTER [facility]

## 2019-05-31 NOTE — TELEPHONE ENCOUNTER
Medicare online for insurance coverage of Bilateral L3-4, L4-5 and L5-S1 facet injections cpt codes 70599-51,16074-07,20288-27  . Insurance was verified and procedure is a cover benefit and does not require authorization. Will inform Nursing.

## 2019-05-31 NOTE — TELEPHONE ENCOUNTER
Medicare Online for insurance coverage of Bilateral L3-4, L4-5 and L5-S1 facet injections cpt code [id number], [id number], [id number]. Insurance was verified and procedure is a covered benefit and does not require authorization.   Will inform Nursing

## 2019-06-02 PROBLEM — G47.00 INSOMNIA: Status: ACTIVE | Noted: 2019-06-02

## 2019-06-03 NOTE — TELEPHONE ENCOUNTER
Spoke with patient's wife, notified injection was approved, states the patient is out of town until Wednesday, will call back after Wednesday to schedule.

## 2019-06-07 NOTE — TELEPHONE ENCOUNTER
Patient has been scheduled for a Bilateral L3-4, L4-5 and L5-S1 facet injections   On 6/27/19  at the [facility]. Medications and allergies reviewed.  Patient informed to hold aspirins, nsaids, blood thinners, multivitamins, vitamin E and fish oils 3-7 days prior

## 2019-06-27 ENCOUNTER — OFFICE VISIT (OUTPATIENT)
Dept: SURGERY | Facility: CLINIC | Age: 74
End: 2019-06-27

## 2019-06-27 DIAGNOSIS — M47.816 LUMBAR SPONDYLOSIS: Primary | ICD-10-CM

## 2019-06-27 PROCEDURE — 64494 INJ PARAVERT F JNT L/S 2 LEV: CPT | Performed by: PHYSICAL MEDICINE & REHABILITATION

## 2019-06-27 PROCEDURE — 64493 INJ PARAVERT F JNT L/S 1 LEV: CPT | Performed by: PHYSICAL MEDICINE & REHABILITATION

## 2019-06-27 PROCEDURE — 64495 INJ PARAVERT F JNT L/S 3 LEV: CPT | Performed by: PHYSICAL MEDICINE & REHABILITATION

## 2019-06-27 NOTE — PROCEDURES
Preoperative Diagnosis:  (M47.816) Lumbar spondylosis  (primary encounter diagnosis)       Postoperative Diagnosis:  (M47.816) Lumbar spondylosis  (primary encounter diagnosis)       Procedures:  Bilateral L3-4, L4-5 and L5-S1 Facet injection(s) under fluo

## 2019-07-22 ENCOUNTER — OFFICE VISIT (OUTPATIENT)
Dept: NEUROLOGY | Facility: CLINIC | Age: 74
End: 2019-07-22
Payer: MEDICARE

## 2019-07-22 VITALS
HEART RATE: 72 BPM | RESPIRATION RATE: 16 BRPM | HEIGHT: 69 IN | SYSTOLIC BLOOD PRESSURE: 118 MMHG | BODY MASS INDEX: 32.58 KG/M2 | DIASTOLIC BLOOD PRESSURE: 68 MMHG | WEIGHT: 220 LBS

## 2019-07-22 DIAGNOSIS — G47.00 INSOMNIA, UNSPECIFIED TYPE: ICD-10-CM

## 2019-07-22 DIAGNOSIS — M47.816 LUMBAR SPONDYLOSIS: Primary | ICD-10-CM

## 2019-07-22 DIAGNOSIS — Z96.641 HISTORY OF TOTAL RIGHT HIP REPLACEMENT: ICD-10-CM

## 2019-07-22 DIAGNOSIS — K21.9 GASTROESOPHAGEAL REFLUX DISEASE, ESOPHAGITIS PRESENCE NOT SPECIFIED: ICD-10-CM

## 2019-07-22 DIAGNOSIS — E66.3 PATIENT OVERWEIGHT: ICD-10-CM

## 2019-07-22 PROCEDURE — 99213 OFFICE O/P EST LOW 20 MIN: CPT | Performed by: PHYSICAL MEDICINE & REHABILITATION

## 2019-07-22 RX ORDER — TADALAFIL 10 MG/1
10 TABLET ORAL AS NEEDED
COMMUNITY
End: 2019-11-20

## 2019-07-22 RX ORDER — TAMSULOSIN HYDROCHLORIDE 0.4 MG/1
0.4 CAPSULE ORAL DAILY
COMMUNITY
End: 2019-07-22

## 2019-07-22 NOTE — PROGRESS NOTES
[address]  Progress Note    CHIEF COMPLAINT:  Patient presents with:  Low Back Pain: Established patient here for follow up after lumbar injection on 6/27/19. He is doing about 20% better.   Was good for a Social History    Occupational History      Not on file    Tobacco Use      Smoking status: Former Smoker        Years: 30.00        Types: Cigarettes        Quit date: 2000        Years since quittin.5      Smokeless tobacco: Never Used    [employer] denies   Musculoskeletal  Joint Stiffness: admits  Painful Joints: admits   Neurological  Loss of Strength Since last Visit: denies  Tingling/Numbness: denies            All other systems reviewed and are negative.  Pertinent positives and negatives noted i OR)      Glucosamine-Chondroit-Vit C-Mn (GLUCOSAMINE CHONDR 1500 COMPLX OR)      Tadalafil (CIALIS) 10 MG Oral Tab      RTC    Return if symptoms worsen or fail to improve. Discharge Instructions were provided as documented in AVS summary.   The carroll

## 2019-09-06 ENCOUNTER — HOSPITAL ENCOUNTER (OUTPATIENT)
Dept: GENERAL RADIOLOGY | Facility: HOSPITAL | Age: 74
Discharge: HOME | End: 2019-09-06
Attending: PHYSICIAN ASSISTANT
Payer: MEDICARE

## 2019-09-06 DIAGNOSIS — Z96.641 HISTORY OF RIGHT HIP REPLACEMENT: ICD-10-CM

## 2019-09-06 PROCEDURE — 73502 X-RAY EXAM HIP UNI 2-3 VIEWS: CPT | Performed by: PHYSICIAN ASSISTANT

## 2019-09-25 ENCOUNTER — TELEPHONE (OUTPATIENT)
Dept: NEUROLOGY | Facility: CLINIC | Age: 74
End: 2019-09-25

## 2019-09-25 DIAGNOSIS — M47.816 LUMBAR SPONDYLOSIS: Primary | ICD-10-CM

## 2019-09-25 NOTE — TELEPHONE ENCOUNTER
Spoke with patient, states he had a Bilateral L3-4, L4-5 and L5-S1 Facet injections 6/27/19, got relief for 2 weeks after and the pain started to come back, states his pain is no better where he started, would like to proceed with the radiofrequency neurot

## 2019-10-15 ENCOUNTER — TELEPHONE (OUTPATIENT)
Dept: NEUROLOGY | Facility: CLINIC | Age: 74
End: 2019-10-15

## 2019-10-15 NOTE — TELEPHONE ENCOUNTER
Wife was called asked if order for lumbar RFN spoke about with Benjamin Barbour on 07/22/19 visit have been written since facet injection did not give good relief. Reviewed chart no ordered.  Told Dr Earl New will be updated and once order written and auth we can sched

## 2019-10-15 NOTE — TELEPHONE ENCOUNTER
Patient has been scheduled for a Bilateral L2, L3, L4 and L5 medial branch blocks on 11/7/19 at the [facility]. Medications and allergies reviewed.  Patient informed to hold aspirins, nsaids, blood thinners, multivitamins, vitamin E and fish oils 3-7 days prior t

## 2019-10-15 NOTE — TELEPHONE ENCOUNTER
Medicare Online for insurance coverage of Bilateral L2, L3, L4 and L5 medial branch blocks cpt codes [id number], [id number], [id number]. Insurance was verified and procedure is a covered benefit and nichole snot require authorization. .  Will inform Nursing

## 2019-11-07 ENCOUNTER — OFFICE VISIT (OUTPATIENT)
Dept: SURGERY | Facility: CLINIC | Age: 74
End: 2019-11-07
Payer: MEDICARE

## 2019-11-07 DIAGNOSIS — M47.816 LUMBAR SPONDYLOSIS: Primary | ICD-10-CM

## 2019-11-07 PROCEDURE — 64495 INJ PARAVERT F JNT L/S 3 LEV: CPT | Performed by: PHYSICAL MEDICINE & REHABILITATION

## 2019-11-07 PROCEDURE — 64494 INJ PARAVERT F JNT L/S 2 LEV: CPT | Performed by: PHYSICAL MEDICINE & REHABILITATION

## 2019-11-07 PROCEDURE — 64493 INJ PARAVERT F JNT L/S 1 LEV: CPT | Performed by: PHYSICAL MEDICINE & REHABILITATION

## 2019-11-07 NOTE — PROCEDURES
Preoperative Diagnosis:  (M47.816) Lumbar spondylosis  (primary encounter diagnosis)       Postoperative Diagnosis:  (M47.816) Lumbar spondylosis  (primary encounter diagnosis)       Procedures:  Bilateral L2, L3, L4 and L5 medial branch block injection(s)

## 2019-11-20 ENCOUNTER — OFFICE VISIT (OUTPATIENT)
Dept: INTERNAL MEDICINE CLINIC | Facility: CLINIC | Age: 74
End: 2019-11-20
Payer: MEDICARE

## 2019-11-20 ENCOUNTER — OFFICE VISIT (OUTPATIENT)
Dept: NEUROLOGY | Facility: CLINIC | Age: 74
End: 2019-11-20
Payer: MEDICARE

## 2019-11-20 VITALS
HEIGHT: 69 IN | HEART RATE: 68 BPM | RESPIRATION RATE: 16 BRPM | DIASTOLIC BLOOD PRESSURE: 76 MMHG | WEIGHT: 218 LBS | BODY MASS INDEX: 32.29 KG/M2 | SYSTOLIC BLOOD PRESSURE: 138 MMHG

## 2019-11-20 VITALS
BODY MASS INDEX: 32.94 KG/M2 | DIASTOLIC BLOOD PRESSURE: 62 MMHG | HEIGHT: 69 IN | TEMPERATURE: 98 F | WEIGHT: 222.38 LBS | SYSTOLIC BLOOD PRESSURE: 142 MMHG | HEART RATE: 71 BPM | OXYGEN SATURATION: 98 %

## 2019-11-20 DIAGNOSIS — I10 BENIGN HYPERTENSION: Primary | ICD-10-CM

## 2019-11-20 DIAGNOSIS — K21.9 GASTROESOPHAGEAL REFLUX DISEASE WITHOUT ESOPHAGITIS: ICD-10-CM

## 2019-11-20 DIAGNOSIS — K21.9 GASTROESOPHAGEAL REFLUX DISEASE, ESOPHAGITIS PRESENCE NOT SPECIFIED: ICD-10-CM

## 2019-11-20 DIAGNOSIS — M47.816 SPONDYLOSIS OF LUMBAR REGION WITHOUT MYELOPATHY OR RADICULOPATHY: ICD-10-CM

## 2019-11-20 DIAGNOSIS — N40.0 PROSTATISM: ICD-10-CM

## 2019-11-20 DIAGNOSIS — E66.3 PATIENT OVERWEIGHT: ICD-10-CM

## 2019-11-20 DIAGNOSIS — M16.11 PRIMARY OSTEOARTHRITIS OF RIGHT HIP: ICD-10-CM

## 2019-11-20 DIAGNOSIS — G47.00 INSOMNIA, UNSPECIFIED TYPE: ICD-10-CM

## 2019-11-20 DIAGNOSIS — M47.816 LUMBAR SPONDYLOSIS: Primary | ICD-10-CM

## 2019-11-20 PROCEDURE — G0463 HOSPITAL OUTPT CLINIC VISIT: HCPCS | Performed by: INTERNAL MEDICINE

## 2019-11-20 PROCEDURE — 99214 OFFICE O/P EST MOD 30 MIN: CPT | Performed by: INTERNAL MEDICINE

## 2019-11-20 PROCEDURE — 99214 OFFICE O/P EST MOD 30 MIN: CPT | Performed by: PHYSICAL MEDICINE & REHABILITATION

## 2019-11-20 RX ORDER — SCOLOPAMINE TRANSDERMAL SYSTEM 1 MG/1
1 PATCH, EXTENDED RELEASE TRANSDERMAL
Qty: 4 PATCH | Refills: 0 | Status: SHIPPED | OUTPATIENT
Start: 2019-11-20 | End: 2020-02-12

## 2019-11-20 RX ORDER — DULOXETIN HYDROCHLORIDE 30 MG/1
30 CAPSULE, DELAYED RELEASE ORAL DAILY
Qty: 30 CAPSULE | Refills: 0 | Status: SHIPPED | OUTPATIENT
Start: 2019-11-20 | End: 2019-12-14

## 2019-11-20 NOTE — PROGRESS NOTES
Martin Li is a 69year old male. HPI:   Patient presents with:  Blood Pressure: Had a procedure done with DR. Yuni Singh to lower back 2 weeks, BP was . Had a f/u today with DR. Yuni Singh 138/72      70 y/o M with Hypertension here for F/U; SBP every 6 (six) hours as needed for Pain. • Esomeprazole Magnesium 40 MG Oral Capsule Delayed Release TAKE 1 CAPSULE EVERY       MORNING BEFORE BREAKFAST 90 capsule 3   • Perindopril Erbumine 4 MG Oral Tab Take 1 tablet (4 mg total) by mouth once daily. stopped both as inhalers caused voice change; nuclear stress test negative for myocardial perfusion in [place] 5715     GERD (gastroesophageal reflux disease)  On Nexium 40 mg po qD; s/p fundoplication which may have failed; s/p EGD in March 2016 by MICAH Roberts MD

## 2019-11-20 NOTE — PROGRESS NOTES
[address]  Progress Note    CHIEF COMPLAINT:  Patient presents with:  Low Back Pain: Patient presents for follow up on  Bilateral L2, L3, L4 and L5 medial branch block injection had done 11/7/19.  Patient st fundoplication       SOCIAL HISTORY:   Social History    Occupational History      Not on file    Tobacco Use      Smoking status: Former Smoker        Years: 30.00        Types: Cigarettes        Quit date: 2000        Years since quittin.9 Stiffness: admits  Painful Joints: admits   Neurological  Loss of Strength Since last Visit: admits  Tingling/Numbness: denies            All other systems reviewed and are negative. Pertinent positives and negatives noted in the HPI.         PHYSICAL EXAM: about 4 weeks (around 12/18/2019). Discharge Instructions were provided as documented in AVS summary. The patient was in agreement with the assessment and plan. All questions were answered. There were no barriers to learning.         Chloe Quarles

## 2019-11-23 PROBLEM — E66.3 PATIENT OVERWEIGHT: Status: ACTIVE | Noted: 2019-11-23

## 2019-11-26 ENCOUNTER — TELEPHONE (OUTPATIENT)
Dept: NEUROLOGY | Facility: CLINIC | Age: 74
End: 2019-11-26

## 2019-11-26 NOTE — TELEPHONE ENCOUNTER
Patient's wife calling because she is concerned about patient. States that Dr. Jeremiah Marcos prescribed Klonopin and that patient staerted taking medication Saturday, a couple of hours later patient started with nausea and vomiting.  Patient's wife is wondering if

## 2019-11-26 NOTE — TELEPHONE ENCOUNTER
Actually I prescribed duloxetine, NOT Klonopin. If duloxetine, has he taken it every day, or just once? If just once he should try it again to make sure it wasn't something else. If it has been consistent over a few days, he should stop it.  If it is Klonop

## 2019-12-05 ENCOUNTER — TELEPHONE (OUTPATIENT)
Dept: INTERNAL MEDICINE CLINIC | Facility: CLINIC | Age: 74
End: 2019-12-05

## 2019-12-05 NOTE — TELEPHONE ENCOUNTER
[address] [place] [address] called  Generic Scopolamine patch requires Prior Estevan Perez [phone]  Pt ID# [id number]

## 2019-12-14 NOTE — TELEPHONE ENCOUNTER
Medication request: Duloxetine 30 mg, Take 1 capsule by mouth daily.  Qt 30 Refills 0    LOV: 11/20/19  NOV: 12/20/19    Last refill:11/2019

## 2019-12-16 RX ORDER — DULOXETIN HYDROCHLORIDE 30 MG/1
30 CAPSULE, DELAYED RELEASE ORAL DAILY
Qty: 30 CAPSULE | Refills: 0 | Status: SHIPPED | OUTPATIENT
Start: 2019-12-16 | End: 2019-12-20

## 2019-12-20 ENCOUNTER — OFFICE VISIT (OUTPATIENT)
Dept: NEUROLOGY | Facility: CLINIC | Age: 74
End: 2019-12-20
Payer: MEDICARE

## 2019-12-20 VITALS
BODY MASS INDEX: 32.58 KG/M2 | DIASTOLIC BLOOD PRESSURE: 70 MMHG | HEIGHT: 69 IN | RESPIRATION RATE: 16 BRPM | SYSTOLIC BLOOD PRESSURE: 130 MMHG | WEIGHT: 220 LBS | HEART RATE: 60 BPM

## 2019-12-20 DIAGNOSIS — K21.9 GASTROESOPHAGEAL REFLUX DISEASE, ESOPHAGITIS PRESENCE NOT SPECIFIED: ICD-10-CM

## 2019-12-20 DIAGNOSIS — G47.00 INSOMNIA, UNSPECIFIED TYPE: ICD-10-CM

## 2019-12-20 DIAGNOSIS — M47.816 LUMBAR SPONDYLOSIS: Primary | ICD-10-CM

## 2019-12-20 PROCEDURE — 99213 OFFICE O/P EST LOW 20 MIN: CPT | Performed by: PHYSICAL MEDICINE & REHABILITATION

## 2019-12-20 NOTE — PROGRESS NOTES
[address]  Progress Note    CHIEF COMPLAINT:  Patient presents with:  Low Back Pain: Patient presents for follow up on low back pain, LOV:11/20/19.  Patient states he still has pain, was taking cymbalta 30 m Occupational History      Not on file    Tobacco Use      Smoking status: Former Smoker        Years: 30.00        Types: Cigarettes        Quit date: 2000        Years since quittin.9      Smokeless tobacco: Never Used    Substance and Sexual Act Pertinent positives and negatives noted in the HPI.         PHYSICAL EXAM:   /70 (BP Location: Right arm, Patient Position: Sitting, Cuff Size: adult)   Pulse 60   Resp 16   Ht 69\"   Wt 220 lb (99.8 kg)   BMI 32.49 kg/m²     Body mass index is 32.49

## 2019-12-23 ENCOUNTER — MED REC SCAN ONLY (OUTPATIENT)
Dept: NEUROLOGY | Facility: CLINIC | Age: 74
End: 2019-12-23

## 2020-01-13 NOTE — TELEPHONE ENCOUNTER
Pt due for physical in Feb    Please call patient to schedule annual OV then back to clinical for refill.

## 2020-01-15 RX ORDER — ALFUZOSIN HYDROCHLORIDE 10 MG/1
10 TABLET, EXTENDED RELEASE ORAL DAILY
Qty: 90 TABLET | Refills: 3 | Status: SHIPPED | OUTPATIENT
Start: 2020-01-15 | End: 2021-01-31

## 2020-01-29 ENCOUNTER — MED REC SCAN ONLY (OUTPATIENT)
Dept: INTERNAL MEDICINE CLINIC | Facility: CLINIC | Age: 75
End: 2020-01-29

## 2020-02-04 ENCOUNTER — TELEPHONE (OUTPATIENT)
Dept: INTERNAL MEDICINE CLINIC | Facility: CLINIC | Age: 75
End: 2020-02-04

## 2020-02-04 NOTE — TELEPHONE ENCOUNTER
Patient has a physical on 2/12. He wants a lab order sent to [facility] on [address] [facility].     Fax to:  [phone]

## 2020-02-04 NOTE — TELEPHONE ENCOUNTER
Lab orders pended, expect for PSA diagnostic as patient had one done 5/2019    To Dr Guillermo Acevedo

## 2020-02-05 ENCOUNTER — TELEPHONE (OUTPATIENT)
Dept: INTERNAL MEDICINE CLINIC | Facility: CLINIC | Age: 75
End: 2020-02-05

## 2020-02-05 ENCOUNTER — PATIENT MESSAGE (OUTPATIENT)
Dept: INTERNAL MEDICINE CLINIC | Facility: CLINIC | Age: 75
End: 2020-02-05

## 2020-02-05 DIAGNOSIS — R97.20 ELEVATED PSA: Primary | ICD-10-CM

## 2020-02-05 NOTE — TELEPHONE ENCOUNTER
Patient will be coming for medicare annual visit - orders are in system - do you want to add PSA? Existing orders already faxed to [phone] - confirmation received - to DR. DUMONT

## 2020-02-06 NOTE — TELEPHONE ENCOUNTER
Dr. Montana Bacon---    No orders noted in system or in outbox? Would you like RN to re-pend yearly lab protocol?

## 2020-02-12 ENCOUNTER — OFFICE VISIT (OUTPATIENT)
Dept: INTERNAL MEDICINE CLINIC | Facility: CLINIC | Age: 75
End: 2020-02-12
Payer: MEDICARE

## 2020-02-12 ENCOUNTER — TELEPHONE (OUTPATIENT)
Dept: INTERNAL MEDICINE CLINIC | Facility: CLINIC | Age: 75
End: 2020-02-12

## 2020-02-12 VITALS
WEIGHT: 224.38 LBS | OXYGEN SATURATION: 96 % | TEMPERATURE: 98 F | HEART RATE: 80 BPM | HEIGHT: 67.7 IN | BODY MASS INDEX: 34.4 KG/M2 | SYSTOLIC BLOOD PRESSURE: 128 MMHG | DIASTOLIC BLOOD PRESSURE: 78 MMHG

## 2020-02-12 DIAGNOSIS — M47.816 SPONDYLOSIS OF LUMBAR REGION WITHOUT MYELOPATHY OR RADICULOPATHY: ICD-10-CM

## 2020-02-12 DIAGNOSIS — I10 BENIGN HYPERTENSION: ICD-10-CM

## 2020-02-12 DIAGNOSIS — M16.11 PRIMARY OSTEOARTHRITIS OF RIGHT HIP: ICD-10-CM

## 2020-02-12 DIAGNOSIS — R06.00 DYSPNEA, UNSPECIFIED TYPE: ICD-10-CM

## 2020-02-12 DIAGNOSIS — Z00.00 PHYSICAL EXAM, ANNUAL: Primary | ICD-10-CM

## 2020-02-12 DIAGNOSIS — Z80.0 FAMILY HISTORY OF COLON CANCER: ICD-10-CM

## 2020-02-12 DIAGNOSIS — K21.9 GASTROESOPHAGEAL REFLUX DISEASE WITHOUT ESOPHAGITIS: ICD-10-CM

## 2020-02-12 DIAGNOSIS — N40.0 PROSTATISM: ICD-10-CM

## 2020-02-12 DIAGNOSIS — E66.9 OBESITY (BMI 30.0-34.9): ICD-10-CM

## 2020-02-12 PROCEDURE — G0439 PPPS, SUBSEQ VISIT: HCPCS | Performed by: INTERNAL MEDICINE

## 2020-02-12 RX ORDER — AMLODIPINE BESYLATE 10 MG/1
10 TABLET ORAL DAILY
Qty: 90 TABLET | Refills: 3 | Status: SHIPPED | OUTPATIENT
Start: 2020-02-12 | End: 2020-06-10

## 2020-02-12 RX ORDER — ESOMEPRAZOLE MAGNESIUM 40 MG/1
CAPSULE, DELAYED RELEASE ORAL
Qty: 90 CAPSULE | Refills: 3 | Status: SHIPPED | OUTPATIENT
Start: 2020-02-12 | End: 2020-02-12

## 2020-02-12 RX ORDER — AMLODIPINE BESYLATE 10 MG/1
10 TABLET ORAL DAILY
Qty: 90 TABLET | Refills: 3 | Status: SHIPPED | OUTPATIENT
Start: 2020-02-12 | End: 2020-02-12

## 2020-02-12 RX ORDER — PERINDOPRIL ERBUMINE 4 MG/1
4 TABLET ORAL
Qty: 90 TABLET | Refills: 3 | Status: SHIPPED | OUTPATIENT
Start: 2020-02-12 | End: 2021-02-12

## 2020-02-12 RX ORDER — CIPROFLOXACIN 500 MG/1
500 TABLET, FILM COATED ORAL 2 TIMES DAILY
Qty: 28 TABLET | Refills: 0 | Status: SHIPPED | OUTPATIENT
Start: 2020-02-12 | End: 2020-07-15 | Stop reason: ALTCHOICE

## 2020-02-12 RX ORDER — ESOMEPRAZOLE MAGNESIUM 40 MG/1
CAPSULE, DELAYED RELEASE ORAL
Qty: 90 CAPSULE | Refills: 3 | Status: SHIPPED | OUTPATIENT
Start: 2020-02-12 | End: 2021-02-12

## 2020-02-12 RX ORDER — PERINDOPRIL ERBUMINE 4 MG/1
4 TABLET ORAL
Qty: 90 TABLET | Refills: 3 | Status: SHIPPED | OUTPATIENT
Start: 2020-02-12 | End: 2020-02-12

## 2020-02-12 NOTE — TELEPHONE ENCOUNTER
Pt saw Dr Ramos Pod today, medication was sent to [facility] in [place]. Pt is now calling to get those medications canceled and sent through mail order instead.     Pharmacy:   [facility]  Phone # [phone]  Fax # [phone]    Best call back: 368

## 2020-02-12 NOTE — PROGRESS NOTES
Bay Archer is a 69year old male.     HPI:   Patient presents with:  Physical: medicare annual wellness visit      70 y/o M here for subsequent Medicare annual wellness exam;  no CP; +mild SOB; had not tolerated Symbicort or albuterol in the past; (20 mg total) by mouth daily. 30 capsule 0   • [START ON 4/14/2020] Lisdexamfetamine Dimesylate (VYVANSE) 20 MG Oral Cap Take 1 capsule (20 mg total) by mouth daily.  30 capsule 0   • Alfuzosin HCl ER 10 MG Oral Tablet 24 Hr Take 1 tablet (10 mg total) by m Yes    Hearing Problems?: No     Functional Status     Hearing Problems?: No    Vision Problems? : No    Difficulty walking?: No    Difficulty dressing or bathing?: No    Problems with daily activities? : No    Memory Problems?: No      Fall/Risk Assessmen HbgA1C   Annually No results found for: A1C No flowsheet data found.     Fasting Blood Sugar (FSB) Annually Glucose (mg/dL)   Date Value   03/05/2019 95     GLUCOSE (mg/dL)   Date Value   04/01/2011 76       Cardiovascular Disease Screening     LDL Annually Date Value   03/05/2019 0.96    No flowsheet data found. Digoxin Serum Conc  Annually No results found for: DIGOXIN No flowsheet data found. Diabetes      HgbA1C  Annually No results found for: A1C No flowsheet data found.     Creat/alb ratio  Annua normoactive bowel sounds, soft, non-tender and non-distended  Extremities: no clubbing, cyanosis or edema  Vascular: no carotid bruits; DP/PT 2/2  Musculoskeletal: Motor 5/5 upper and lower extremities  Neurological: cranial nerves II-XII intact; light margarette seeing physiatrist, Dr Chelsea Becerra;s/p bilateral L2, L3, L4 and L5 medial branch block injection(s) under fluoroscopic guidance and contrast enhancement; awaits possible RF ablation  -did not tolerate Cymbalta 30 mg po qD per Dr Birgit Green due to averse side ef

## 2020-02-12 NOTE — TELEPHONE ENCOUNTER
Called local pharmacy to cancel RX that were sent today - RX sent to mail order pharmacy- patient notified

## 2020-03-06 ENCOUNTER — HOSPITAL ENCOUNTER (OUTPATIENT)
Dept: GENERAL RADIOLOGY | Facility: HOSPITAL | Age: 75
Discharge: HOME | End: 2020-03-06
Attending: ORTHOPAEDIC SURGERY
Payer: MEDICARE

## 2020-03-06 DIAGNOSIS — Z96.641 HISTORY OF RIGHT HIP REPLACEMENT: ICD-10-CM

## 2020-03-06 PROCEDURE — 73502 X-RAY EXAM HIP UNI 2-3 VIEWS: CPT | Performed by: ORTHOPAEDIC SURGERY

## 2020-06-10 ENCOUNTER — OFFICE VISIT (OUTPATIENT)
Dept: INTERNAL MEDICINE CLINIC | Facility: CLINIC | Age: 75
End: 2020-06-10
Payer: MEDICARE

## 2020-06-10 VITALS
TEMPERATURE: 98 F | OXYGEN SATURATION: 96 % | BODY MASS INDEX: 35.75 KG/M2 | DIASTOLIC BLOOD PRESSURE: 74 MMHG | WEIGHT: 233.19 LBS | SYSTOLIC BLOOD PRESSURE: 140 MMHG | HEART RATE: 64 BPM | HEIGHT: 67.7 IN

## 2020-06-10 DIAGNOSIS — Z12.5 SCREENING PSA (PROSTATE SPECIFIC ANTIGEN): ICD-10-CM

## 2020-06-10 DIAGNOSIS — K21.9 GASTROESOPHAGEAL REFLUX DISEASE WITHOUT ESOPHAGITIS: ICD-10-CM

## 2020-06-10 DIAGNOSIS — R60.0 BILATERAL LOWER EXTREMITY EDEMA: ICD-10-CM

## 2020-06-10 DIAGNOSIS — G62.9 PERIPHERAL POLYNEUROPATHY: ICD-10-CM

## 2020-06-10 DIAGNOSIS — I10 BENIGN HYPERTENSION: Primary | ICD-10-CM

## 2020-06-10 PROCEDURE — G0463 HOSPITAL OUTPT CLINIC VISIT: HCPCS | Performed by: INTERNAL MEDICINE

## 2020-06-10 PROCEDURE — 99214 OFFICE O/P EST MOD 30 MIN: CPT | Performed by: INTERNAL MEDICINE

## 2020-06-10 RX ORDER — FUROSEMIDE 20 MG/1
20 TABLET ORAL DAILY
Qty: 90 TABLET | Refills: 3 | Status: SHIPPED | OUTPATIENT
Start: 2020-06-10 | End: 2021-02-12

## 2020-06-10 RX ORDER — AMLODIPINE BESYLATE 5 MG/1
5 TABLET ORAL DAILY
Qty: 90 TABLET | Refills: 3 | Status: SHIPPED | OUTPATIENT
Start: 2020-06-10 | End: 2020-07-15

## 2020-06-10 NOTE — PROGRESS NOTES
Ruth López is a 77year old male.     HPI:   Patient presents with:  Edema: edema bilateral ankles , in morning also hands       78 y/o M with HTN here with c/o BLE edema; edema increases and worsens throughout day, and it is least severe in AM upo 3   • Alfuzosin HCl ER 10 MG Oral Tablet 24 Hr Take 1 tablet (10 mg total) by mouth daily. 90 tablet 3   • Multiple Vitamins-Minerals (CENTRUM ADULTS OR) Take by mouth daily. • ibuprofen 200 MG Oral Tab Take 200 mg by mouth as needed for Pain.      • ac due to COPD    +SOB x 6 months; +COOLEY after walking up stairs; CXR neg infiltrate or mass;  PFTs in April 2017 show normal FEV1 on 3.00 L, though small airways decreased (as low as 59%) from predicted with >15% bronchodilator response; tried Symbicort 80/4. did not take Vyvanse 20 mg po qD as appetite suppressant due to cost concerns     Travel prophlaxis  Went to [place] in Jan 2020; tolerated Trans Scop 1.5 mg po q3d; now going to [facility] in March 2020;  Rx given for Cipro 500 mg po BID x 14d            RTC

## 2020-07-15 ENCOUNTER — OFFICE VISIT (OUTPATIENT)
Dept: INTERNAL MEDICINE CLINIC | Facility: CLINIC | Age: 75
End: 2020-07-15
Payer: MEDICARE

## 2020-07-15 VITALS
BODY MASS INDEX: 36 KG/M2 | HEART RATE: 67 BPM | DIASTOLIC BLOOD PRESSURE: 82 MMHG | WEIGHT: 233 LBS | SYSTOLIC BLOOD PRESSURE: 156 MMHG | OXYGEN SATURATION: 98 % | TEMPERATURE: 97 F

## 2020-07-15 DIAGNOSIS — I35.8 AORTIC VALVE SCLEROSIS: ICD-10-CM

## 2020-07-15 DIAGNOSIS — G62.9 PERIPHERAL POLYNEUROPATHY: ICD-10-CM

## 2020-07-15 DIAGNOSIS — R60.0 BILATERAL LOWER EXTREMITY EDEMA: ICD-10-CM

## 2020-07-15 DIAGNOSIS — K21.9 GASTROESOPHAGEAL REFLUX DISEASE WITHOUT ESOPHAGITIS: ICD-10-CM

## 2020-07-15 DIAGNOSIS — R01.1 HEART MURMUR: Primary | ICD-10-CM

## 2020-07-15 PROCEDURE — G0463 HOSPITAL OUTPT CLINIC VISIT: HCPCS | Performed by: INTERNAL MEDICINE

## 2020-07-15 PROCEDURE — 99214 OFFICE O/P EST MOD 30 MIN: CPT | Performed by: INTERNAL MEDICINE

## 2020-07-15 RX ORDER — METOPROLOL SUCCINATE 25 MG/1
25 TABLET, EXTENDED RELEASE ORAL DAILY
Qty: 30 TABLET | Refills: 5 | Status: SHIPPED | OUTPATIENT
Start: 2020-07-15 | End: 2021-01-02

## 2020-07-15 NOTE — PROGRESS NOTES
Eligio Oliva is a 77year old male. HPI:   Patient presents with:   Follow - Up: Feels swelling is about the same as at last OV       78 y/o M with HTN here for F/U BLE edema; had decreased amlodipine to 5 mg po qD, and he has been taking Lasix 20 MORNING BEFORE BREAKFAST 90 capsule 3   • Perindopril Erbumine 4 MG Oral Tab Take 1 tablet (4 mg total) by mouth once daily. 90 tablet 3   • Alfuzosin HCl ER 10 MG Oral Tablet 24 Hr Take 1 tablet (10 mg total) by mouth daily.  90 tablet 3   • Multiple Vitam Aceon 4 mg po qD, and amlodipine to 5 mg po qD  -due to LE edema, will stop amlodipine to 5 mg po qD  -continue Lasix 20 mg po qD;  BMP stable in June 2020  -start metoprolol ER 25 mg po qD     BLE edema  Suspect venous insufficiency;   -continue Lasix 20 m suspect +LBP is most likely due to osteoarthritis; has been seeing physiatrist, Dr Jennifer Becerra;s/p bilateral L2, L3, L4 and L5 medial branch block injection(s) under fluoroscopic guidance and contrast enhancement; awaits possible RF ablation  -did not tole

## 2020-07-21 ENCOUNTER — TELEPHONE (OUTPATIENT)
Dept: NEUROLOGY | Facility: CLINIC | Age: 75
End: 2020-07-21

## 2020-07-21 ENCOUNTER — OFFICE VISIT (OUTPATIENT)
Dept: NEUROLOGY | Facility: CLINIC | Age: 75
End: 2020-07-21
Payer: MEDICARE

## 2020-07-21 VITALS — BODY MASS INDEX: 32.58 KG/M2 | HEIGHT: 69 IN | WEIGHT: 220 LBS

## 2020-07-21 DIAGNOSIS — G47.00 INSOMNIA, UNSPECIFIED TYPE: ICD-10-CM

## 2020-07-21 DIAGNOSIS — M47.816 LUMBAR SPONDYLOSIS: Primary | ICD-10-CM

## 2020-07-21 DIAGNOSIS — E66.3 PATIENT OVERWEIGHT: ICD-10-CM

## 2020-07-21 DIAGNOSIS — K21.9 GASTROESOPHAGEAL REFLUX DISEASE, ESOPHAGITIS PRESENCE NOT SPECIFIED: ICD-10-CM

## 2020-07-21 PROCEDURE — 99214 OFFICE O/P EST MOD 30 MIN: CPT | Performed by: PHYSICAL MEDICINE & REHABILITATION

## 2020-07-21 NOTE — TELEPHONE ENCOUNTER
Medicare Online for insurance coverage of Bilateral L34, L45 and L5-S1 facet injections  cpt codes 72977-73, 99827-JP, 52455-DI, 27026-CJ, 70223-GG. Insurance was verified and procedure is a covered benefit. Authorization is not required.  Will inform Nursi

## 2020-07-21 NOTE — PROGRESS NOTES
Patient was scheduled for Bilateral L34, L45 and L5-S1 facet injections on Thursday, July 30, 2020. Medications and allergies reviewed. Patient informed he will need a .  Patient informed not to eat or drink anything after midnight the night prior

## 2020-07-21 NOTE — PROGRESS NOTES
[address]  Progress Note    CHIEF COMPLAINT:  Patient presents with:  Low Back Pain: LOV: 12/20/19. F/U on lower back pain.  Patient states that the pain on bilateral sides of lower back has been getting wor (GLUCOSAMINE 1500 COMPLEX OR) Take by mouth. • Metoprolol Succinate ER 25 MG Oral Tablet 24 Hr Take 1 tablet (25 mg total) by mouth daily. 30 tablet 5   • furosemide 20 MG Oral Tab Take 1 tablet (20 mg total) by mouth daily.  90 tablet 3   • Esomeprazol extremities have 5/5 strength  Sensation: Normal lower extremities  Reflexes: Normal lower extremities  SLR: neg        Data    Radiology Imagin. plain films of the hips showing severe OA.   2.  lumbar plain film reviewed through care everywhere from

## 2020-07-24 ENCOUNTER — MED REC SCAN ONLY (OUTPATIENT)
Dept: INTERNAL MEDICINE CLINIC | Facility: CLINIC | Age: 75
End: 2020-07-24

## 2020-07-30 ENCOUNTER — OFFICE VISIT (OUTPATIENT)
Dept: SURGERY | Facility: CLINIC | Age: 75
End: 2020-07-30

## 2020-07-30 DIAGNOSIS — M47.816 LUMBAR SPONDYLOSIS: Primary | ICD-10-CM

## 2020-07-30 PROCEDURE — 64494 INJ PARAVERT F JNT L/S 2 LEV: CPT | Performed by: PHYSICAL MEDICINE & REHABILITATION

## 2020-07-30 PROCEDURE — 64495 INJ PARAVERT F JNT L/S 3 LEV: CPT | Performed by: PHYSICAL MEDICINE & REHABILITATION

## 2020-07-30 PROCEDURE — 64493 INJ PARAVERT F JNT L/S 1 LEV: CPT | Performed by: PHYSICAL MEDICINE & REHABILITATION

## 2020-08-07 ENCOUNTER — OFFICE VISIT (OUTPATIENT)
Dept: INTERNAL MEDICINE CLINIC | Facility: CLINIC | Age: 75
End: 2020-08-07
Payer: MEDICARE

## 2020-08-07 VITALS
OXYGEN SATURATION: 97 % | HEART RATE: 61 BPM | SYSTOLIC BLOOD PRESSURE: 138 MMHG | TEMPERATURE: 98 F | HEIGHT: 69 IN | DIASTOLIC BLOOD PRESSURE: 72 MMHG | WEIGHT: 232.81 LBS | BODY MASS INDEX: 34.48 KG/M2

## 2020-08-07 DIAGNOSIS — G62.9 PERIPHERAL POLYNEUROPATHY: ICD-10-CM

## 2020-08-07 DIAGNOSIS — I51.89 DIASTOLIC DYSFUNCTION: ICD-10-CM

## 2020-08-07 DIAGNOSIS — R60.0 BILATERAL LOWER EXTREMITY EDEMA: ICD-10-CM

## 2020-08-07 DIAGNOSIS — I10 BENIGN HYPERTENSION: Primary | ICD-10-CM

## 2020-08-07 DIAGNOSIS — K21.9 GASTROESOPHAGEAL REFLUX DISEASE WITHOUT ESOPHAGITIS: ICD-10-CM

## 2020-08-07 DIAGNOSIS — R06.00 DYSPNEA, UNSPECIFIED TYPE: ICD-10-CM

## 2020-08-07 PROCEDURE — G0463 HOSPITAL OUTPT CLINIC VISIT: HCPCS | Performed by: INTERNAL MEDICINE

## 2020-08-07 PROCEDURE — 99214 OFFICE O/P EST MOD 30 MIN: CPT | Performed by: INTERNAL MEDICINE

## 2020-08-07 NOTE — PROGRESS NOTES
Ruth López is a 77year old male. HPI:   Patient presents with:   Follow - Up: F/U on edema ankles      78 y/o M with HTN, LE edema here for F/U; tolerating addition of metoprolol ER 25 mg po qD;  no CP; +chronic SOB; no headaches; no palpitatio tablet (20 mg total) by mouth daily.  90 tablet 3   • Esomeprazole Magnesium 40 MG Oral Capsule Delayed Release TAKE 1 CAPSULE EVERY       MORNING BEFORE BREAKFAST 90 capsule 3   • Perindopril Erbumine 4 MG Oral Tab Take 1 tablet (4 mg total) by mouth once stable on Lasix 20 mg po qD;  BMP stable in June 2020; UA neg protenuria    Peripheral neuropathy  Affects both feet; B12 level 1090 in June 2020;  use of Nexium may be inhibiting oral absorption of B12     Dyspnea likely due to COPD    +SOB x 6 months; +DO scupuncture     OA right hip  S/p right THR in March 2019 by orthopedics Dr Cam White     Insomnia  Takes Advil PM prn; off Ambien      ED    Cialis 10 mg po qD prn     Diverticulosis  Colonoscopy in March 2016 by Dr Jose M Garcia     Obesity  Body mass index is 3

## 2020-09-21 ENCOUNTER — TELEPHONE (OUTPATIENT)
Dept: INTERNAL MEDICINE CLINIC | Facility: CLINIC | Age: 75
End: 2020-09-21

## 2020-09-21 NOTE — TELEPHONE ENCOUNTER
Anastasia Kuhn, spoke to [facility]. Esomeprazole alternatives for cheaper cost are available--Jodi will re-fax alternatives list for MD to review. Nothing further.

## 2020-09-21 NOTE — TELEPHONE ENCOUNTER
Zbigniew calling asking if Dr. George Shukla received fax regarding lower cost for Esomeprazole.      Best number to contact patient at [phone]

## 2020-11-22 RX ORDER — PERINDOPRIL ERBUMINE 4 MG/1
TABLET ORAL
Qty: 90 TABLET | Refills: 3 | OUTPATIENT
Start: 2020-11-22

## 2020-11-22 RX ORDER — ESOMEPRAZOLE MAGNESIUM 40 MG/1
CAPSULE, DELAYED RELEASE ORAL
Qty: 90 CAPSULE | Refills: 3 | OUTPATIENT
Start: 2020-11-22

## 2020-11-22 NOTE — TELEPHONE ENCOUNTER
Current refill request refused due to refill is either a duplicate request or has active refills at the pharmacy. Check previous templates.     Requested Prescriptions     Refused Prescriptions Disp Refills   • ESOMEPRAZOLE MAGNESIUM 40 MG Oral Capsule Del

## 2020-12-17 RX ORDER — ALFUZOSIN HYDROCHLORIDE 10 MG/1
10 TABLET, EXTENDED RELEASE ORAL DAILY
Qty: 90 TABLET | Refills: 3 | Status: CANCELLED | OUTPATIENT
Start: 2020-12-17

## 2020-12-17 RX ORDER — METOPROLOL SUCCINATE 25 MG/1
25 TABLET, EXTENDED RELEASE ORAL DAILY
Qty: 30 TABLET | Refills: 5 | Status: CANCELLED | OUTPATIENT
Start: 2020-12-17

## 2020-12-17 RX ORDER — FUROSEMIDE 20 MG/1
20 TABLET ORAL DAILY
Qty: 90 TABLET | Refills: 3 | Status: CANCELLED | OUTPATIENT
Start: 2020-12-17

## 2020-12-17 RX ORDER — ESOMEPRAZOLE MAGNESIUM 40 MG/1
CAPSULE, DELAYED RELEASE ORAL
Qty: 90 CAPSULE | Refills: 3 | Status: CANCELLED | OUTPATIENT
Start: 2020-12-17

## 2020-12-23 ENCOUNTER — TELEPHONE (OUTPATIENT)
Dept: INTERNAL MEDICINE CLINIC | Facility: CLINIC | Age: 75
End: 2020-12-23

## 2020-12-23 RX ORDER — PERINDOPRIL ERBUMINE 4 MG/1
4 TABLET ORAL
Qty: 90 TABLET | Refills: 3 | Status: CANCELLED | OUTPATIENT
Start: 2020-12-23

## 2020-12-31 NOTE — TELEPHONE ENCOUNTER
Received fax from [facility]-  per pt's request -  for Perindopril Erbumine 4 mg Rx  In purple folder   Fax# [phone]

## 2021-01-02 RX ORDER — METOPROLOL SUCCINATE 25 MG/1
25 TABLET, EXTENDED RELEASE ORAL DAILY
Qty: 30 TABLET | Refills: 5 | Status: SHIPPED | OUTPATIENT
Start: 2021-01-02 | End: 2021-02-12

## 2021-01-29 ENCOUNTER — PATIENT MESSAGE (OUTPATIENT)
Dept: INTERNAL MEDICINE CLINIC | Facility: CLINIC | Age: 76
End: 2021-01-29

## 2021-01-29 NOTE — TELEPHONE ENCOUNTER
From: Lenard Clayton  To: Nik Talbert MD  Sent: 1/29/2021 12:24 PM CST  Subject: Non-Urgent Medical Question    Dr. Ramya Ward,    Sorry to bother you but thought it would not hurt to ask.  My wife and I have been trying for the past week to schedu

## 2021-02-02 RX ORDER — ALFUZOSIN HYDROCHLORIDE 10 MG/1
10 TABLET, EXTENDED RELEASE ORAL DAILY
Qty: 90 TABLET | Refills: 3 | Status: SHIPPED | OUTPATIENT
Start: 2021-02-02 | End: 2021-02-12

## 2021-02-02 RX ORDER — ALFUZOSIN HYDROCHLORIDE 10 MG/1
TABLET, EXTENDED RELEASE ORAL
Qty: 90 TABLET | Refills: 0 | OUTPATIENT
Start: 2021-02-02

## 2021-02-02 NOTE — TELEPHONE ENCOUNTER
Current refill request refused due to refill is either a duplicate request or has active refills at the pharmacy. Check previous templates.     Requested Prescriptions     Refused Prescriptions Disp Refills   • ALFUZOSIN HCL ER 10 MG Oral Tablet 24 Hr [Pha

## 2021-02-08 ENCOUNTER — TELEPHONE (OUTPATIENT)
Dept: INTERNAL MEDICINE CLINIC | Facility: CLINIC | Age: 76
End: 2021-02-08

## 2021-02-08 DIAGNOSIS — R97.20 ELEVATED PSA: Primary | ICD-10-CM

## 2021-02-08 DIAGNOSIS — Z00.00 PHYSICAL EXAM, ANNUAL: ICD-10-CM

## 2021-02-08 NOTE — TELEPHONE ENCOUNTER
To Dr. Santos Cohen---    Labs pended for your review. Nursing to fax orders to fax number listed below once completed.

## 2021-02-08 NOTE — TELEPHONE ENCOUNTER
----- Message from Evy Briceno sent at 2/8/2021  9:49 AM CST -----  Regarding: Prescription Question  Contact: [phone]  Dr. Mati Cohen you are well and staying warm! I have a quick question for you.      I have an appointment for my yea

## 2021-02-09 NOTE — TELEPHONE ENCOUNTER
Lab order faxed to number listed below. Awaiting confirmation. Called and notified patient that labs were ordered and will be fasting. Original order sent to scanning. Copy of paperwork placed in Dr. Scruggs Mention outbox.  Nursing to follow up on 2/9/21 Seizure disorder Chronic hypercapnic respiratory failure

## 2021-02-12 ENCOUNTER — OFFICE VISIT (OUTPATIENT)
Dept: INTERNAL MEDICINE CLINIC | Facility: CLINIC | Age: 76
End: 2021-02-12
Payer: MEDICARE

## 2021-02-12 VITALS
WEIGHT: 239 LBS | HEART RATE: 65 BPM | DIASTOLIC BLOOD PRESSURE: 86 MMHG | SYSTOLIC BLOOD PRESSURE: 156 MMHG | TEMPERATURE: 98 F | OXYGEN SATURATION: 97 % | HEIGHT: 69 IN | BODY MASS INDEX: 35.4 KG/M2

## 2021-02-12 DIAGNOSIS — R60.0 BILATERAL LOWER EXTREMITY EDEMA: ICD-10-CM

## 2021-02-12 DIAGNOSIS — G62.9 PERIPHERAL POLYNEUROPATHY: ICD-10-CM

## 2021-02-12 DIAGNOSIS — R06.00 DYSPNEA, UNSPECIFIED TYPE: ICD-10-CM

## 2021-02-12 DIAGNOSIS — Z00.00 PHYSICAL EXAM, ANNUAL: Primary | ICD-10-CM

## 2021-02-12 DIAGNOSIS — Z80.0 FAMILY HISTORY OF COLON CANCER: ICD-10-CM

## 2021-02-12 DIAGNOSIS — M16.11 PRIMARY OSTEOARTHRITIS OF RIGHT HIP: ICD-10-CM

## 2021-02-12 DIAGNOSIS — K21.9 GASTROESOPHAGEAL REFLUX DISEASE WITHOUT ESOPHAGITIS: ICD-10-CM

## 2021-02-12 DIAGNOSIS — N40.0 PROSTATISM: ICD-10-CM

## 2021-02-12 DIAGNOSIS — I10 BENIGN HYPERTENSION: ICD-10-CM

## 2021-02-12 DIAGNOSIS — I51.89 DIASTOLIC DYSFUNCTION: ICD-10-CM

## 2021-02-12 DIAGNOSIS — M47.816 SPONDYLOSIS OF LUMBAR REGION WITHOUT MYELOPATHY OR RADICULOPATHY: ICD-10-CM

## 2021-02-12 DIAGNOSIS — Z12.5 SCREENING PSA (PROSTATE SPECIFIC ANTIGEN): ICD-10-CM

## 2021-02-12 DIAGNOSIS — E66.9 OBESITY, CLASS II, BMI 35-39.9: ICD-10-CM

## 2021-02-12 PROCEDURE — G0439 PPPS, SUBSEQ VISIT: HCPCS | Performed by: INTERNAL MEDICINE

## 2021-02-12 RX ORDER — METHOCARBAMOL 750 MG/1
750 TABLET, FILM COATED ORAL EVERY 6 HOURS PRN
Qty: 60 TABLET | Refills: 5 | Status: SHIPPED | OUTPATIENT
Start: 2021-02-12

## 2021-02-12 RX ORDER — METOPROLOL SUCCINATE 25 MG/1
25 TABLET, EXTENDED RELEASE ORAL DAILY
Qty: 90 TABLET | Refills: 3 | Status: SHIPPED | OUTPATIENT
Start: 2021-02-12 | End: 2021-12-06

## 2021-02-12 RX ORDER — ALFUZOSIN HYDROCHLORIDE 10 MG/1
10 TABLET, EXTENDED RELEASE ORAL DAILY
Qty: 90 TABLET | Refills: 3 | Status: SHIPPED | OUTPATIENT
Start: 2021-02-12 | End: 2022-01-29

## 2021-02-12 RX ORDER — ESOMEPRAZOLE MAGNESIUM 40 MG/1
CAPSULE, DELAYED RELEASE ORAL
Qty: 90 CAPSULE | Refills: 3 | Status: SHIPPED | OUTPATIENT
Start: 2021-02-12 | End: 2021-12-06

## 2021-02-12 RX ORDER — PERINDOPRIL ERBUMINE 4 MG/1
4 TABLET ORAL
Qty: 90 TABLET | Refills: 3 | Status: SHIPPED | OUTPATIENT
Start: 2021-02-12 | End: 2021-12-06

## 2021-02-12 RX ORDER — FUROSEMIDE 20 MG/1
20 TABLET ORAL DAILY
Qty: 90 TABLET | Refills: 3 | Status: SHIPPED | OUTPATIENT
Start: 2021-02-12

## 2021-02-12 NOTE — PROGRESS NOTES
Tyler Adkins is a 77year old male.     HPI:   Patient presents with:  Physical: Annual Medicare Px      78 y/o M here for subsequent Medicare annual wellness exam; reports that activity limited by lower back stiffness and exertional dyspnea; he fredy BEFORE BREAKFAST 90 capsule 3   • Alfuzosin HCl ER 10 MG Oral Tablet 24 Hr Take 1 tablet (10 mg total) by mouth daily. 90 tablet 3   • Metoprolol Succinate ER 25 MG Oral Tablet 24 Hr Take 1 tablet (25 mg total) by mouth daily.  90 tablet 3   • furosemide 20 No    Vision Problems? : No    Difficulty walking?: Yes    Difficulty dressing or bathing?: No    Problems with daily activities? : Yes    Memory Problems?: No      Fall/Risk Assessment     Do you have 3 or more medical conditions?: 0-No    Have you fallen Cardiovascular Disease Screening     LDL Annually LDL Cholesterol (mg/dL)   Date Value   03/05/2019 109 (H)        EKG One Time     Colorectal Cancer Screening      Colonoscopy Screen every 10 years [facility] No flowsheet data found. Creat/alb ratio  Annually      LDL  Annually LDL Cholesterol (mg/dL)   Date Value   03/05/2019 109 (H)    No flowsheet data found. Dilated Eye exam  Annually No flowsheet data found. No flowsheet data found.     COPD      Sp light touch and proprioception intact  Skin: no rash or ulcerations         ASSESSMENT/PLAN:   Physical exam      Hypertension   SBP 140s on Aceon 4 mg po qD, and metoprolol ER 25 mg po qD (did not tolerate amlodipine due to leg edema); BMP stable in Feb 2 s/p physical therapy in 2017 under care of [address] to pursue facet injections; had MRI lumbar spine in 2018 under care of [address]; suspect +LBP is most likely due to osteoarthritis; has been seeing physiatrist, Dr Eric Walton mouth daily. • methocarbamol 750 MG Oral Tab 60 tablet 5     Sig: Take 1 tablet (750 mg total) by mouth every 6 (six) hours as needed (muscle spasm).        Imaging & Referrals:  None     2/12/2021  Queen Gene MD

## 2021-03-09 DIAGNOSIS — Z23 NEED FOR VACCINATION: ICD-10-CM

## 2021-03-19 ENCOUNTER — HOSPITAL ENCOUNTER (OUTPATIENT)
Dept: GENERAL RADIOLOGY | Facility: HOSPITAL | Age: 76
Discharge: HOME | End: 2021-03-19
Attending: ORTHOPAEDIC SURGERY
Payer: MEDICARE

## 2021-03-19 DIAGNOSIS — Z96.641 HISTORY OF TOTAL RIGHT HIP REPLACEMENT: ICD-10-CM

## 2021-03-19 PROCEDURE — 73502 X-RAY EXAM HIP UNI 2-3 VIEWS: CPT | Performed by: ORTHOPAEDIC SURGERY

## 2021-04-15 RX ORDER — FUROSEMIDE 20 MG/1
20 TABLET ORAL DAILY
Qty: 90 TABLET | Refills: 3 | Status: CANCELLED | OUTPATIENT
Start: 2021-04-15

## 2021-04-16 NOTE — TELEPHONE ENCOUNTER
1 year sent 2/12/21     Current refill request refused due to refill is either a duplicate request or has active refills at the pharmacy. Check previous templates.     Requested Prescriptions     Pending Prescriptions Disp Refills   • furosemide 20 MG Oral

## 2021-04-22 RX ORDER — AMLODIPINE BESYLATE 10 MG/1
TABLET ORAL
Qty: 90 TABLET | Refills: 0 | OUTPATIENT
Start: 2021-04-22

## 2021-04-22 NOTE — TELEPHONE ENCOUNTER
Pt no longer takes this medication. Per MD note 2/21:  \"did not tolerate amlodipine due to leg edema\"    Current refill request refused due to refill is either a duplicate request or has active refills at the pharmacy. Check previous templates.     Reque

## 2021-04-28 ENCOUNTER — TELEPHONE (OUTPATIENT)
Dept: NEUROLOGY | Facility: CLINIC | Age: 76
End: 2021-04-28

## 2021-04-28 NOTE — TELEPHONE ENCOUNTER
S/w wife who states patient's back is bothering him still. Patient recently saw an ortho surgeon and had MRI done. Patient requesting injection. Patient given appt 05/03/21 and will bring imaging to appt.

## 2021-05-03 ENCOUNTER — OFFICE VISIT (OUTPATIENT)
Dept: NEUROLOGY | Facility: CLINIC | Age: 76
End: 2021-05-03
Payer: MEDICARE

## 2021-05-03 ENCOUNTER — TELEPHONE (OUTPATIENT)
Dept: NEUROLOGY | Facility: CLINIC | Age: 76
End: 2021-05-03

## 2021-05-03 VITALS — WEIGHT: 240 LBS | HEIGHT: 69 IN | BODY MASS INDEX: 35.55 KG/M2

## 2021-05-03 DIAGNOSIS — M47.816 LUMBAR SPONDYLOSIS: ICD-10-CM

## 2021-05-03 DIAGNOSIS — K21.9 GASTROESOPHAGEAL REFLUX DISEASE, UNSPECIFIED WHETHER ESOPHAGITIS PRESENT: ICD-10-CM

## 2021-05-03 DIAGNOSIS — M15.9 GENERALIZED OSTEOARTHRITIS: Primary | ICD-10-CM

## 2021-05-03 DIAGNOSIS — G47.00 INSOMNIA, UNSPECIFIED TYPE: ICD-10-CM

## 2021-05-03 PROCEDURE — 99214 OFFICE O/P EST MOD 30 MIN: CPT | Performed by: PHYSICAL MEDICINE & REHABILITATION

## 2021-05-03 RX ORDER — DULOXETIN HYDROCHLORIDE 30 MG/1
30 CAPSULE, DELAYED RELEASE ORAL DAILY
Qty: 30 CAPSULE | Refills: 0 | Status: SHIPPED | OUTPATIENT
Start: 2021-05-03 | End: 2021-06-04

## 2021-05-03 RX ORDER — DULOXETIN HYDROCHLORIDE 30 MG/1
30 CAPSULE, DELAYED RELEASE ORAL DAILY
Qty: 30 CAPSULE | Refills: 0 | Status: SHIPPED | OUTPATIENT
Start: 2021-05-03 | End: 2021-05-03

## 2021-05-03 NOTE — PROGRESS NOTES
[address]  Progress Note    CHIEF COMPLAINT:  Patient presents with:  Low Back Pain: LOV: 7/30/21 Pt f/u on localized bilateral low back pain, denies N/T. Pain worsens when standing, walking, lifting.   No Quit date: 2000        Years since quittin.3      Smokeless tobacco: Never Used    Vaping Use      Vaping Use: Never used    Substance and Sexual Activity      Alcohol use: Yes        Comment: wine, 1 glass daily      Drug use: No      Sexual a Bruising: denies  Easy Bleeding: denies   Genitourinary  Difficulty Urinating: denies  Bladder Incontinence: denies  Pelvic Pain: denies  Painful Urination: denies   Musculoskeletal  Joint Stiffness: admits  Painful Joints: admits  Tailbone Pain: denies  S type      4. Gastroesophageal reflux disease, unspecified whether esophagitis present  NSAIDs relatively contraindicated due to history of upper GI disease or symptoms. Limits treatment options.     No orders of the defined types were placed in this encoun

## 2021-05-03 NOTE — TELEPHONE ENCOUNTER
S/w Nata Rose at [facility] order pharmacy to request a cancellation on Duloxetine since patient would prefer to get the medication his local pharmacy. Duloxetine e-scribed to [facility]

## 2021-06-04 ENCOUNTER — OFFICE VISIT (OUTPATIENT)
Dept: NEUROLOGY | Facility: CLINIC | Age: 76
End: 2021-06-04
Payer: MEDICARE

## 2021-06-04 VITALS
HEIGHT: 69 IN | OXYGEN SATURATION: 96 % | SYSTOLIC BLOOD PRESSURE: 176 MMHG | HEART RATE: 56 BPM | DIASTOLIC BLOOD PRESSURE: 84 MMHG | BODY MASS INDEX: 35.55 KG/M2 | WEIGHT: 240 LBS

## 2021-06-04 DIAGNOSIS — M15.9 GENERALIZED OSTEOARTHRITIS: Primary | ICD-10-CM

## 2021-06-04 DIAGNOSIS — G47.00 INSOMNIA, UNSPECIFIED TYPE: ICD-10-CM

## 2021-06-04 DIAGNOSIS — K21.9 GASTROESOPHAGEAL REFLUX DISEASE, UNSPECIFIED WHETHER ESOPHAGITIS PRESENT: ICD-10-CM

## 2021-06-04 DIAGNOSIS — M47.816 LUMBAR SPONDYLOSIS: ICD-10-CM

## 2021-06-04 PROCEDURE — 99214 OFFICE O/P EST MOD 30 MIN: CPT | Performed by: PHYSICAL MEDICINE & REHABILITATION

## 2021-06-04 RX ORDER — DULOXETIN HYDROCHLORIDE 30 MG/1
30 CAPSULE, DELAYED RELEASE ORAL DAILY
Qty: 30 CAPSULE | Refills: 0 | Status: SHIPPED | OUTPATIENT
Start: 2021-06-04 | End: 2021-06-28

## 2021-06-04 NOTE — PROGRESS NOTES
[address]  Progress Note    CHIEF COMPLAINT:  Patient presents with:  Low Back Pain: LOV: 5/3/2021 Bilateral low back pain .  Patient tried the cymbalta for 1 month and states that at first the medication wa ELECTROCARDIOGRAM, COMPLETE  8/31/2012    scanned to media tab   • HIP REPLACEMENT SURGERY Right 03/13/2018   • OTHER SURGICAL HISTORY  3/27/2012    ACL repair   • OTHER SURGICAL HISTORY      s/p laparoscopic nissen fundoplication       SOCIAL HISTORY:   S (Patient not taking: Reported on 6/4/2021 ) 60 tablet 5       ALLERGIES:   No Known Allergies    REVIEW OF SYSTEMS:   Patient-reported ROS  Constitutional  Sleep Disturbance: denies   Cardiovascular  Chest Pain: denies  Irregular Heartbeat: denies   Gastro type  Improved    4. Gastroesophageal reflux disease, unspecified whether esophagitis present  NSAIDs relatively contraindicated due to history of upper GI disease or symptoms. Limits treatment options.     Orders Placed This Encounter      DULoxetine HCl

## 2021-06-09 ENCOUNTER — PATIENT MESSAGE (OUTPATIENT)
Dept: INTERNAL MEDICINE CLINIC | Facility: CLINIC | Age: 76
End: 2021-06-09

## 2021-06-09 NOTE — TELEPHONE ENCOUNTER
From: Bay Archer  To: Yazan Escobar MD  Sent: 6/9/2021 1:47 PM CDT  Subject: Prescription Question    Dr. Arlene Wheatley,    I recently saw Dr. Sarita Cogan re: my back problem and he has put me on a 30 mg dose of Cymbalta.  It seems to be helping somewhat,

## 2021-06-15 ENCOUNTER — TELEPHONE (OUTPATIENT)
Dept: INTERNAL MEDICINE CLINIC | Facility: CLINIC | Age: 76
End: 2021-06-15

## 2021-06-15 NOTE — TELEPHONE ENCOUNTER
Gabbie 15, 2021       LE    10:14 AM  Finesse Acosta routed this conversation to [facility], [facility] [place]     LE    10:14 AM  Note     Pt. Called to check status of message ph.  # [phone]  routed to Rx               10:13 AM  Mann Pinto (Dostinex), but that may be a last resort if the Cialis doesn’t work. If you do agree to my request, would you kindly send the script to the [facility] in [facility] . Thank you for your consideration and hope you have a wonderful summer. Raymond Leal Finely

## 2021-06-16 RX ORDER — TADALAFIL 5 MG/1
5 TABLET ORAL DAILY
Qty: 30 TABLET | Refills: 5 | Status: SHIPPED | OUTPATIENT
Start: 2021-06-16

## 2021-06-18 ENCOUNTER — PATIENT MESSAGE (OUTPATIENT)
Dept: INTERNAL MEDICINE CLINIC | Facility: CLINIC | Age: 76
End: 2021-06-18

## 2021-06-18 NOTE — TELEPHONE ENCOUNTER
From: Giovani Oliveira  To:  Nehemiah Mcqueen MD  Sent: 6/18/2021 9:50 AM CDT  Subject: Prescription Question    Dr. Gaudencio Cee,    I was wondering if you had an opportunity to review my previous question regarding a prescription request. If you would pre

## 2021-06-27 ENCOUNTER — PATIENT MESSAGE (OUTPATIENT)
Dept: NEUROLOGY | Facility: CLINIC | Age: 76
End: 2021-06-27

## 2021-06-28 RX ORDER — DULOXETIN HYDROCHLORIDE 60 MG/1
60 CAPSULE, DELAYED RELEASE ORAL DAILY
Qty: 90 CAPSULE | Refills: 0 | Status: SHIPPED | OUTPATIENT
Start: 2021-06-28 | End: 2021-08-24

## 2021-06-28 NOTE — TELEPHONE ENCOUNTER
From: Perfecto Frank  To: Cecille Gonzales MD  Sent: 6/27/2021 12:29 PM CDT  Subject: Prescription Question    Dear Dr. Guillermina De Leon,    I have been taking the prescribed dosage of 30 mg of Cymbalta for the last month and it seems to have alleviated some of

## 2021-06-28 NOTE — TELEPHONE ENCOUNTER
Medication request: DULoxetine HCl (CYMBALTA) 30 MG Oral Cap DR Particles  Sig:   Take 1 capsule (30 mg total) by mouth daily.     LOV:6/4/21  NOV: None    [place]: 6/4/21 qty#30 r-0    Patient would like to increase MG to 60mg and a 90 day supply

## 2021-08-24 RX ORDER — DULOXETIN HYDROCHLORIDE 60 MG/1
60 CAPSULE, DELAYED RELEASE ORAL DAILY
Qty: 90 CAPSULE | Refills: 3 | Status: SHIPPED | OUTPATIENT
Start: 2021-08-24 | End: 2022-08-19

## 2021-08-24 NOTE — TELEPHONE ENCOUNTER
Medication request: DULoxetine HCl (CYMBALTA) 60 MG Oral Cap DR Particles    Take 1 capsule (60 mg total) by mouth daily  #90-R0    LOV 06/04/21  NOV None    [place] 06/04/21

## 2021-09-20 ENCOUNTER — TELEPHONE (OUTPATIENT)
Dept: SCHEDULING | Age: 76
End: 2021-09-20

## 2021-09-28 ENCOUNTER — TELEPHONE (OUTPATIENT)
Dept: SCHEDULING | Age: 76
End: 2021-09-28

## 2021-10-06 ENCOUNTER — LAB SERVICES (OUTPATIENT)
Dept: URGENT CARE | Age: 76
End: 2021-10-06

## 2021-10-06 ENCOUNTER — APPOINTMENT (OUTPATIENT)
Dept: URGENT CARE | Age: 76
End: 2021-10-06

## 2021-10-06 DIAGNOSIS — Z20.822 EXPOSURE TO CONFIRMED CASE OF COVID-19: Primary | ICD-10-CM

## 2021-10-06 PROCEDURE — U0005 INFEC AGEN DETEC AMPLI PROBE: HCPCS | Performed by: CLINICAL MEDICAL LABORATORY

## 2021-10-06 PROCEDURE — U0003 INFECTIOUS AGENT DETECTION BY NUCLEIC ACID (DNA OR RNA); SEVERE ACUTE RESPIRATORY SYNDROME CORONAVIRUS 2 (SARS-COV-2) (CORONAVIRUS DISEASE [COVID-19]), AMPLIFIED PROBE TECHNIQUE, MAKING USE OF HIGH THROUGHPUT TECHNOLOGIES AS DESCRIBED BY CMS-2020-01-R: HCPCS | Performed by: CLINICAL MEDICAL LABORATORY

## 2021-10-07 LAB
SARS-COV-2 RNA RESP QL NAA+PROBE: NOT DETECTED
SERVICE CMNT-IMP: NORMAL
SERVICE CMNT-IMP: NORMAL

## 2021-12-06 RX ORDER — METOPROLOL SUCCINATE 25 MG/1
25 TABLET, EXTENDED RELEASE ORAL DAILY
Qty: 90 TABLET | Refills: 0 | Status: SHIPPED | OUTPATIENT
Start: 2021-12-06

## 2021-12-06 RX ORDER — ESOMEPRAZOLE MAGNESIUM 40 MG/1
CAPSULE, DELAYED RELEASE ORAL
Qty: 90 CAPSULE | Refills: 0 | Status: SHIPPED | OUTPATIENT
Start: 2021-12-06

## 2021-12-06 RX ORDER — PERINDOPRIL ERBUMINE 4 MG/1
4 TABLET ORAL
Qty: 90 TABLET | Refills: 0 | Status: SHIPPED | OUTPATIENT
Start: 2021-12-06

## 2022-01-26 RX ORDER — DULOXETIN HYDROCHLORIDE 60 MG/1
60 CAPSULE, DELAYED RELEASE ORAL DAILY
Qty: 90 CAPSULE | Refills: 3 | Status: CANCELLED | OUTPATIENT
Start: 2022-01-26 | End: 2023-01-21

## 2022-01-27 NOTE — TELEPHONE ENCOUNTER
Cymbalta last ordered 8/24/21 for 1 year supply. Attempted to call patient to notify has refills remaining on Rx. Unable to leave message. Will attempt to notify patient later.

## 2022-01-29 RX ORDER — ALFUZOSIN HYDROCHLORIDE 10 MG/1
10 TABLET, EXTENDED RELEASE ORAL DAILY
Qty: 90 TABLET | Refills: 0 | Status: SHIPPED | OUTPATIENT
Start: 2022-01-29

## 2022-01-29 NOTE — TELEPHONE ENCOUNTER
Pt has scheduled appointment on 2/18/2022  Requested Prescriptions     Signed Prescriptions Disp Refills   • ALFUZOSIN 10 MG Oral Tablet 24 Hr 90 tablet 0     Sig: TAKE 1 TABLET (10 MG TOTAL) BY MOUTH DAILY. Authorizing Provider:  Ning Calderon

## 2022-02-03 ENCOUNTER — PATIENT MESSAGE (OUTPATIENT)
Dept: INTERNAL MEDICINE CLINIC | Facility: CLINIC | Age: 77
End: 2022-02-03

## 2022-02-03 ENCOUNTER — TELEPHONE (OUTPATIENT)
Dept: INTERNAL MEDICINE CLINIC | Facility: CLINIC | Age: 77
End: 2022-02-03

## 2022-02-08 NOTE — TELEPHONE ENCOUNTER
Received fax from [facility] lab with 2/8/2022 results. Placed results in Dr. Davis Salinas.

## 2022-02-10 NOTE — TELEPHONE ENCOUNTER
To Pablo Gallegos for consult, last labs 3/5/2019 in epic, it seems pt did fax new labs to [facility] on 2/8/22 (In Dr. Luciano Sanchez) from outside lab. He has appt scheduled 2/18/22.  Please advise

## 2022-02-11 RX ORDER — FUROSEMIDE 20 MG/1
TABLET ORAL
Qty: 90 TABLET | Refills: 0 | Status: SHIPPED | OUTPATIENT
Start: 2022-02-11 | End: 2022-02-18

## 2022-02-18 ENCOUNTER — OFFICE VISIT (OUTPATIENT)
Dept: INTERNAL MEDICINE CLINIC | Facility: CLINIC | Age: 77
End: 2022-02-18
Payer: MEDICARE

## 2022-02-18 VITALS
HEART RATE: 54 BPM | TEMPERATURE: 98 F | WEIGHT: 229.63 LBS | HEIGHT: 69 IN | SYSTOLIC BLOOD PRESSURE: 132 MMHG | BODY MASS INDEX: 34.01 KG/M2 | DIASTOLIC BLOOD PRESSURE: 80 MMHG | OXYGEN SATURATION: 96 %

## 2022-02-18 DIAGNOSIS — R06.00 DYSPNEA, UNSPECIFIED TYPE: ICD-10-CM

## 2022-02-18 DIAGNOSIS — K21.9 GASTROESOPHAGEAL REFLUX DISEASE WITHOUT ESOPHAGITIS: ICD-10-CM

## 2022-02-18 DIAGNOSIS — R60.0 BILATERAL LOWER EXTREMITY EDEMA: ICD-10-CM

## 2022-02-18 DIAGNOSIS — E66.9 OBESITY, CLASS II, BMI 35-39.9: ICD-10-CM

## 2022-02-18 DIAGNOSIS — M16.11 PRIMARY OSTEOARTHRITIS OF RIGHT HIP: ICD-10-CM

## 2022-02-18 DIAGNOSIS — I51.89 DIASTOLIC DYSFUNCTION: ICD-10-CM

## 2022-02-18 DIAGNOSIS — Z80.0 FAMILY HISTORY OF COLON CANCER: ICD-10-CM

## 2022-02-18 DIAGNOSIS — Z12.5 SCREENING PSA (PROSTATE SPECIFIC ANTIGEN): ICD-10-CM

## 2022-02-18 DIAGNOSIS — I10 BENIGN HYPERTENSION: ICD-10-CM

## 2022-02-18 DIAGNOSIS — Z00.00 PHYSICAL EXAM, ANNUAL: Primary | ICD-10-CM

## 2022-02-18 DIAGNOSIS — N40.0 PROSTATISM: ICD-10-CM

## 2022-02-18 DIAGNOSIS — G62.9 PERIPHERAL POLYNEUROPATHY: ICD-10-CM

## 2022-02-18 DIAGNOSIS — M47.816 SPONDYLOSIS OF LUMBAR REGION WITHOUT MYELOPATHY OR RADICULOPATHY: ICD-10-CM

## 2022-02-18 PROCEDURE — G0439 PPPS, SUBSEQ VISIT: HCPCS | Performed by: INTERNAL MEDICINE

## 2022-02-18 PROCEDURE — 99214 OFFICE O/P EST MOD 30 MIN: CPT | Performed by: INTERNAL MEDICINE

## 2022-02-18 RX ORDER — AZITHROMYCIN 250 MG/1
TABLET, FILM COATED ORAL
Qty: 6 TABLET | Refills: 0 | Status: SHIPPED | OUTPATIENT
Start: 2022-02-18 | End: 2022-02-18

## 2022-02-18 RX ORDER — AZITHROMYCIN 250 MG/1
TABLET, FILM COATED ORAL
Qty: 6 TABLET | Refills: 0 | Status: SHIPPED | OUTPATIENT
Start: 2022-02-18 | End: 2022-02-23

## 2022-02-18 RX ORDER — PERINDOPRIL ERBUMINE 4 MG/1
4 TABLET ORAL
Qty: 90 TABLET | Refills: 3 | Status: SHIPPED | OUTPATIENT
Start: 2022-03-01

## 2022-02-18 RX ORDER — FUROSEMIDE 20 MG/1
20 TABLET ORAL DAILY
Qty: 90 TABLET | Refills: 3 | Status: SHIPPED | OUTPATIENT
Start: 2022-03-01

## 2022-02-18 RX ORDER — ALFUZOSIN HYDROCHLORIDE 10 MG/1
10 TABLET, EXTENDED RELEASE ORAL DAILY
Qty: 90 TABLET | Refills: 3 | Status: SHIPPED | OUTPATIENT
Start: 2022-03-01

## 2022-02-18 RX ORDER — TADALAFIL 10 MG/1
10 TABLET ORAL
Qty: 24 TABLET | Refills: 3 | Status: SHIPPED | OUTPATIENT
Start: 2022-02-18

## 2022-02-18 RX ORDER — ESOMEPRAZOLE MAGNESIUM 40 MG/1
40 CAPSULE, DELAYED RELEASE ORAL
Qty: 90 CAPSULE | Refills: 3 | Status: SHIPPED | OUTPATIENT
Start: 2022-03-01

## 2022-02-18 RX ORDER — METOPROLOL SUCCINATE 25 MG/1
25 TABLET, EXTENDED RELEASE ORAL DAILY
Qty: 90 TABLET | Refills: 3 | Status: SHIPPED | OUTPATIENT
Start: 2022-02-01

## 2022-02-19 RX ORDER — ESOMEPRAZOLE MAGNESIUM 40 MG/1
CAPSULE, DELAYED RELEASE ORAL
Qty: 90 CAPSULE | Refills: 0 | OUTPATIENT
Start: 2022-02-19

## 2022-02-19 RX ORDER — METOPROLOL SUCCINATE 25 MG/1
TABLET, EXTENDED RELEASE ORAL
Qty: 90 TABLET | Refills: 0 | OUTPATIENT
Start: 2022-02-19

## 2022-02-19 RX ORDER — PERINDOPRIL ERBUMINE 4 MG/1
TABLET ORAL
Qty: 90 TABLET | Refills: 0 | OUTPATIENT
Start: 2022-02-19

## 2022-03-04 ENCOUNTER — LAB SERVICES (OUTPATIENT)
Dept: URGENT CARE | Age: 77
End: 2022-03-04

## 2022-03-04 DIAGNOSIS — Z11.52 ENCOUNTER FOR SCREENING LABORATORY TESTING FOR COVID-19 VIRUS IN ASYMPTOMATIC PATIENT: Primary | ICD-10-CM

## 2022-03-04 DIAGNOSIS — Z01.812 ENCOUNTER FOR SCREENING LABORATORY TESTING FOR COVID-19 VIRUS IN ASYMPTOMATIC PATIENT: Primary | ICD-10-CM

## 2022-03-04 PROCEDURE — U0003 INFECTIOUS AGENT DETECTION BY NUCLEIC ACID (DNA OR RNA); SEVERE ACUTE RESPIRATORY SYNDROME CORONAVIRUS 2 (SARS-COV-2) (CORONAVIRUS DISEASE [COVID-19]), AMPLIFIED PROBE TECHNIQUE, MAKING USE OF HIGH THROUGHPUT TECHNOLOGIES AS DESCRIBED BY CMS-2020-01-R: HCPCS | Performed by: CLINICAL MEDICAL LABORATORY

## 2022-03-04 PROCEDURE — U0005 INFEC AGEN DETEC AMPLI PROBE: HCPCS | Performed by: CLINICAL MEDICAL LABORATORY

## 2022-03-05 LAB
SARS-COV-2 RNA RESP QL NAA+PROBE: NOT DETECTED
SERVICE CMNT-IMP: NORMAL
SERVICE CMNT-IMP: NORMAL

## 2022-05-19 ENCOUNTER — TELEPHONE (OUTPATIENT)
Dept: INTERNAL MEDICINE CLINIC | Facility: CLINIC | Age: 77
End: 2022-05-19

## 2022-05-19 NOTE — TELEPHONE ENCOUNTER
Pt scheduled mychart appt 5/25/22 for the following:    \"Cough and labored breathing\"    Ami Diaz for patient to wait to be seen?     Tasked to nursing

## 2022-05-25 ENCOUNTER — OFFICE VISIT (OUTPATIENT)
Dept: INTERNAL MEDICINE CLINIC | Facility: CLINIC | Age: 77
End: 2022-05-25
Payer: MEDICARE

## 2022-05-25 VITALS
BODY MASS INDEX: 36.53 KG/M2 | HEIGHT: 69 IN | TEMPERATURE: 98 F | HEART RATE: 63 BPM | DIASTOLIC BLOOD PRESSURE: 88 MMHG | WEIGHT: 246.63 LBS | OXYGEN SATURATION: 97 % | SYSTOLIC BLOOD PRESSURE: 162 MMHG

## 2022-05-25 DIAGNOSIS — R05.3 CHRONIC COUGH: ICD-10-CM

## 2022-05-25 DIAGNOSIS — J44.9 CHRONIC OBSTRUCTIVE PULMONARY DISEASE, UNSPECIFIED COPD TYPE (HCC): ICD-10-CM

## 2022-05-25 DIAGNOSIS — G47.33 OSA (OBSTRUCTIVE SLEEP APNEA): Primary | ICD-10-CM

## 2022-05-25 PROCEDURE — 99214 OFFICE O/P EST MOD 30 MIN: CPT | Performed by: INTERNAL MEDICINE

## 2022-05-25 RX ORDER — ALBUTEROL SULFATE 90 UG/1
2 AEROSOL, METERED RESPIRATORY (INHALATION) EVERY 6 HOURS PRN
Qty: 18 G | Refills: 3 | Status: SHIPPED | OUTPATIENT
Start: 2022-05-25

## 2022-05-25 RX ORDER — PREDNISONE 10 MG/1
TABLET ORAL
Qty: 30 TABLET | Refills: 0 | Status: SHIPPED | OUTPATIENT
Start: 2022-05-25

## 2022-05-25 RX ORDER — FLUTICASONE PROPIONATE AND SALMETEROL 500; 50 UG/1; UG/1
1 POWDER RESPIRATORY (INHALATION) 2 TIMES DAILY
Qty: 60 EACH | Refills: 5 | Status: SHIPPED | OUTPATIENT
Start: 2022-05-25 | End: 2022-06-24

## 2022-05-25 NOTE — TELEPHONE ENCOUNTER
Pt. Called back. He stated he had a covid test done on Monday when disembarking from a cruise ship and it was negative. Advised him that it was OK to keep appt today per Dr. Candice South.

## 2022-05-30 NOTE — TELEPHONE ENCOUNTER
Dr. Lafleur Body sent #30/5 to [address] in [facility]. Per LOV: it's a trial. Called patient who thought pcp only gave him a 30 month supply with no refills. I informed him that he still has 5 additional refills. Nothing further needed at this time.  He would like fu 36.6

## 2022-06-08 ENCOUNTER — HOSPITAL ENCOUNTER (OUTPATIENT)
Dept: CT IMAGING | Facility: HOSPITAL | Age: 77
Discharge: HOME | End: 2022-06-08
Attending: INTERNAL MEDICINE
Payer: MEDICARE

## 2022-06-08 DIAGNOSIS — R05.3 CHRONIC COUGH: ICD-10-CM

## 2022-06-08 DIAGNOSIS — J44.9 CHRONIC OBSTRUCTIVE PULMONARY DISEASE, UNSPECIFIED COPD TYPE (HCC): ICD-10-CM

## 2022-06-08 PROCEDURE — 71250 CT THORAX DX C-: CPT | Performed by: INTERNAL MEDICINE

## 2022-06-09 ENCOUNTER — TELEPHONE (OUTPATIENT)
Dept: INTERNAL MEDICINE CLINIC | Facility: CLINIC | Age: 77
End: 2022-06-09

## 2022-06-09 DIAGNOSIS — Z20.822 ENCOUNTER FOR PREPROCEDURE SCREENING LABORATORY TESTING FOR COVID-19: ICD-10-CM

## 2022-06-09 DIAGNOSIS — J44.9 CHRONIC OBSTRUCTIVE PULMONARY DISEASE, UNSPECIFIED COPD TYPE (HCC): Primary | ICD-10-CM

## 2022-06-09 DIAGNOSIS — J84.9 ILD (INTERSTITIAL LUNG DISEASE) (HCC): ICD-10-CM

## 2022-06-09 DIAGNOSIS — Z01.812 ENCOUNTER FOR PREPROCEDURE SCREENING LABORATORY TESTING FOR COVID-19: ICD-10-CM

## 2022-06-09 NOTE — TELEPHONE ENCOUNTER
Patient put a message in requesting Dr Rj Linda call him today to discuss CT Results  Patient scheduled an appointment on 6/16/22

## 2022-06-09 NOTE — TELEPHONE ENCOUNTER
To Dr. Mariah Wild----    Rocio received:Dr. Sara Guzman there be any way you could see me this Friday, for just a few minutes to go over the results of my CT scan? I am quite concerned over the few parts I could decipher. Anytime that would be convenient for you would work for me.  Thank Markel Echavarria  [phone]\"    Schedule full please advise

## 2022-06-10 ENCOUNTER — TELEPHONE (OUTPATIENT)
Dept: PULMONOLOGY | Facility: CLINIC | Age: 77
End: 2022-06-10

## 2022-06-10 NOTE — TELEPHONE ENCOUNTER
Pt called in stating he needs an appt as soon as possible with Dr. Jeny Del Valle due to his recent CT scan results. The schedule is booked for pulmonary.  Please follow up [phone]

## 2022-06-13 NOTE — TELEPHONE ENCOUNTER
Dr. Rahul Sheffield requesting CONSULT appointment sooner than first available with either of you r/t imaging completed 6/8/2022, referred by Dr. Candice South.

## 2022-06-16 ENCOUNTER — OFFICE VISIT (OUTPATIENT)
Dept: PULMONOLOGY | Facility: CLINIC | Age: 77
End: 2022-06-16
Payer: MEDICARE

## 2022-06-16 VITALS
DIASTOLIC BLOOD PRESSURE: 79 MMHG | HEART RATE: 66 BPM | SYSTOLIC BLOOD PRESSURE: 134 MMHG | HEIGHT: 69 IN | RESPIRATION RATE: 16 BRPM | BODY MASS INDEX: 35.99 KG/M2 | WEIGHT: 243 LBS | OXYGEN SATURATION: 92 %

## 2022-06-16 DIAGNOSIS — R06.00 DYSPNEA, UNSPECIFIED TYPE: ICD-10-CM

## 2022-06-16 DIAGNOSIS — J84.9 INTERSTITIAL LUNG DISEASE (HCC): ICD-10-CM

## 2022-06-16 DIAGNOSIS — R91.8 PULMONARY INFILTRATES: Primary | ICD-10-CM

## 2022-06-16 PROCEDURE — 99203 OFFICE O/P NEW LOW 30 MIN: CPT | Performed by: INTERNAL MEDICINE

## 2022-06-16 PROCEDURE — 1126F AMNT PAIN NOTED NONE PRSNT: CPT | Performed by: INTERNAL MEDICINE

## 2022-06-16 RX ORDER — PREDNISONE 20 MG/1
TABLET ORAL
Qty: 10 TABLET | Refills: 0 | Status: SHIPPED | OUTPATIENT
Start: 2022-06-16

## 2022-06-17 ENCOUNTER — LAB ENCOUNTER (OUTPATIENT)
Dept: LAB | Facility: HOSPITAL | Age: 77
End: 2022-06-17
Attending: INTERNAL MEDICINE
Payer: MEDICARE

## 2022-06-17 DIAGNOSIS — J84.9 INTERSTITIAL LUNG DISEASE (HCC): ICD-10-CM

## 2022-06-17 DIAGNOSIS — Z01.812 ENCOUNTER FOR PREPROCEDURE SCREENING LABORATORY TESTING FOR COVID-19: ICD-10-CM

## 2022-06-17 DIAGNOSIS — R91.8 PULMONARY INFILTRATES: ICD-10-CM

## 2022-06-17 DIAGNOSIS — Z20.822 ENCOUNTER FOR PREPROCEDURE SCREENING LABORATORY TESTING FOR COVID-19: ICD-10-CM

## 2022-06-17 DIAGNOSIS — R06.00 DYSPNEA, UNSPECIFIED TYPE: ICD-10-CM

## 2022-06-17 LAB
ERYTHROCYTE [SEDIMENTATION RATE] IN BLOOD: 12 MM/HR
NT-PROBNP SERPL-MCNC: 174 PG/ML (ref ?–450)
RHEUMATOID FACT SERPL-ACNC: <10 IU/ML (ref ?–15)
SARS-COV-2 RNA RESP QL NAA+PROBE: NOT DETECTED

## 2022-06-17 PROCEDURE — 36415 COLL VENOUS BLD VENIPUNCTURE: CPT | Performed by: INTERNAL MEDICINE

## 2022-06-17 PROCEDURE — 83880 ASSAY OF NATRIURETIC PEPTIDE: CPT

## 2022-06-17 PROCEDURE — 86431 RHEUMATOID FACTOR QUANT: CPT | Performed by: INTERNAL MEDICINE

## 2022-06-17 PROCEDURE — 85652 RBC SED RATE AUTOMATED: CPT | Performed by: INTERNAL MEDICINE

## 2022-06-17 PROCEDURE — 86038 ANTINUCLEAR ANTIBODIES: CPT

## 2022-06-20 ENCOUNTER — HOSPITAL ENCOUNTER (OUTPATIENT)
Dept: RESPIRATORY THERAPY | Facility: HOSPITAL | Age: 77
Discharge: HOME | End: 2022-06-20
Attending: INTERNAL MEDICINE
Payer: MEDICARE

## 2022-06-20 DIAGNOSIS — J44.9 CHRONIC OBSTRUCTIVE PULMONARY DISEASE, UNSPECIFIED COPD TYPE (HCC): ICD-10-CM

## 2022-06-20 DIAGNOSIS — J84.9 ILD (INTERSTITIAL LUNG DISEASE) (HCC): ICD-10-CM

## 2022-06-20 LAB — NUCLEAR IGG TITR SER IF: NEGATIVE {TITER}

## 2022-06-20 PROCEDURE — 94060 EVALUATION OF WHEEZING: CPT

## 2022-06-20 PROCEDURE — 94729 DIFFUSING CAPACITY: CPT

## 2022-06-20 PROCEDURE — 94726 PLETHYSMOGRAPHY LUNG VOLUMES: CPT

## 2022-06-21 ENCOUNTER — OFFICE VISIT (OUTPATIENT)
Dept: SLEEP MEDICINE | Age: 77
End: 2022-06-21

## 2022-06-21 VITALS
HEIGHT: 69 IN | HEART RATE: 65 BPM | OXYGEN SATURATION: 93 % | WEIGHT: 246.25 LBS | TEMPERATURE: 98.1 F | BODY MASS INDEX: 36.47 KG/M2 | RESPIRATION RATE: 18 BRPM

## 2022-06-21 DIAGNOSIS — R06.83 SNORING: Primary | ICD-10-CM

## 2022-06-21 DIAGNOSIS — E66.9 CLASS 2 OBESITY WITHOUT SERIOUS COMORBIDITY WITH BODY MASS INDEX (BMI) OF 36.0 TO 36.9 IN ADULT, UNSPECIFIED OBESITY TYPE: ICD-10-CM

## 2022-06-21 DIAGNOSIS — Z01.812 PRE-PROCEDURE LAB EXAM: Primary | ICD-10-CM

## 2022-06-21 DIAGNOSIS — G47.10 HYPERSOMNIA, UNSPECIFIED: ICD-10-CM

## 2022-06-21 DIAGNOSIS — G47.20 DYSFUNCTIONS OF SLEEP STAGES OR AROUSAL FROM SLEEP: ICD-10-CM

## 2022-06-21 DIAGNOSIS — M15.9 GENERALIZED OSTEOARTHRITIS: ICD-10-CM

## 2022-06-21 DIAGNOSIS — K21.9 GASTROESOPHAGEAL REFLUX DISEASE WITHOUT ESOPHAGITIS: ICD-10-CM

## 2022-06-21 DIAGNOSIS — J44.9 CHRONIC OBSTRUCTIVE PULMONARY DISEASE, UNSPECIFIED COPD TYPE (CMD): ICD-10-CM

## 2022-06-21 DIAGNOSIS — Z91.89 RISK FACTORS FOR OBSTRUCTIVE SLEEP APNEA: ICD-10-CM

## 2022-06-21 PROBLEM — E66.812 CLASS 2 OBESITY WITHOUT SERIOUS COMORBIDITY WITH BODY MASS INDEX (BMI) OF 36.0 TO 36.9 IN ADULT: Status: ACTIVE | Noted: 2022-06-21

## 2022-06-21 PROCEDURE — 99204 OFFICE O/P NEW MOD 45 MIN: CPT | Performed by: NURSE PRACTITIONER

## 2022-06-21 RX ORDER — ESOMEPRAZOLE MAGNESIUM 40 MG/1
CAPSULE, DELAYED RELEASE ORAL
COMMUNITY
Start: 2022-05-05

## 2022-06-21 RX ORDER — ALFUZOSIN HYDROCHLORIDE 10 MG/1
TABLET, EXTENDED RELEASE ORAL
COMMUNITY
Start: 2022-04-09

## 2022-06-21 RX ORDER — PERINDOPRIL ERBUMINE 4 MG/1
TABLET ORAL
COMMUNITY
Start: 2022-05-05

## 2022-06-21 RX ORDER — FUROSEMIDE 20 MG/1
TABLET ORAL
COMMUNITY
Start: 2022-04-20

## 2022-06-21 RX ORDER — FLUTICASONE PROPIONATE AND SALMETEROL 500; 50 UG/1; UG/1
POWDER RESPIRATORY (INHALATION)
COMMUNITY
Start: 2022-05-25 | End: 2022-12-05 | Stop reason: ALTCHOICE

## 2022-06-21 RX ORDER — METOPROLOL SUCCINATE 25 MG/1
TABLET, EXTENDED RELEASE ORAL
COMMUNITY
Start: 2022-05-05

## 2022-06-21 ASSESSMENT — ENCOUNTER SYMPTOMS
CONSTIPATION: 0
CHILLS: 0
NUMBNESS: 0
PHOTOPHOBIA: 0
SLEEP DISTURBANCES DUE TO BREATHING: 1
BLURRED VISION: 0
PARESTHESIAS: 0
FEVER: 0
EXCESSIVE DAYTIME SLEEPINESS: 1
SORE THROAT: 0
BACK PAIN: 0
DIARRHEA: 0
WEIGHT LOSS: 0
SNORING: 1

## 2022-07-15 ENCOUNTER — OFFICE VISIT (OUTPATIENT)
Dept: INTERNAL MEDICINE CLINIC | Facility: CLINIC | Age: 77
End: 2022-07-15
Payer: MEDICARE

## 2022-07-15 VITALS
OXYGEN SATURATION: 98 % | TEMPERATURE: 97 F | WEIGHT: 245 LBS | RESPIRATION RATE: 16 BRPM | SYSTOLIC BLOOD PRESSURE: 142 MMHG | HEIGHT: 69 IN | DIASTOLIC BLOOD PRESSURE: 78 MMHG | BODY MASS INDEX: 36.29 KG/M2 | HEART RATE: 68 BPM

## 2022-07-15 DIAGNOSIS — R06.00 DYSPNEA, UNSPECIFIED TYPE: ICD-10-CM

## 2022-07-15 DIAGNOSIS — F32.A DEPRESSION, UNSPECIFIED DEPRESSION TYPE: Primary | ICD-10-CM

## 2022-07-15 DIAGNOSIS — I10 BENIGN HYPERTENSION: ICD-10-CM

## 2022-07-15 DIAGNOSIS — I28.8 DILATION OF PULMONARY ARTERY (HCC): ICD-10-CM

## 2022-07-15 DIAGNOSIS — K21.9 GASTROESOPHAGEAL REFLUX DISEASE WITHOUT ESOPHAGITIS: ICD-10-CM

## 2022-07-15 PROCEDURE — 99214 OFFICE O/P EST MOD 30 MIN: CPT | Performed by: INTERNAL MEDICINE

## 2022-07-15 PROCEDURE — 1126F AMNT PAIN NOTED NONE PRSNT: CPT | Performed by: INTERNAL MEDICINE

## 2022-07-15 RX ORDER — FLUTICASONE PROPIONATE AND SALMETEROL 500; 50 UG/1; UG/1
1 POWDER RESPIRATORY (INHALATION) 2 TIMES DAILY
COMMUNITY
Start: 2022-05-25

## 2022-07-15 RX ORDER — SODIUM, POTASSIUM,MAG SULFATES 17.5-3.13G
1 SOLUTION, RECONSTITUTED, ORAL ORAL AS DIRECTED
COMMUNITY
Start: 2022-03-04 | End: 2022-07-15 | Stop reason: ALTCHOICE

## 2022-07-15 RX ORDER — BUPROPION HYDROCHLORIDE 150 MG/1
150 TABLET, EXTENDED RELEASE ORAL 2 TIMES DAILY
Qty: 60 TABLET | Refills: 5 | Status: SHIPPED | OUTPATIENT
Start: 2022-07-15

## 2022-07-16 ENCOUNTER — LAB SERVICES (OUTPATIENT)
Dept: LAB | Age: 77
End: 2022-07-16

## 2022-07-16 DIAGNOSIS — Z01.812 PRE-PROCEDURE LAB EXAM: ICD-10-CM

## 2022-07-16 PROCEDURE — U0003 INFECTIOUS AGENT DETECTION BY NUCLEIC ACID (DNA OR RNA); SEVERE ACUTE RESPIRATORY SYNDROME CORONAVIRUS 2 (SARS-COV-2) (CORONAVIRUS DISEASE [COVID-19]), AMPLIFIED PROBE TECHNIQUE, MAKING USE OF HIGH THROUGHPUT TECHNOLOGIES AS DESCRIBED BY CMS-2020-01-R: HCPCS | Performed by: CLINICAL MEDICAL LABORATORY

## 2022-07-16 PROCEDURE — U0005 INFEC AGEN DETEC AMPLI PROBE: HCPCS | Performed by: CLINICAL MEDICAL LABORATORY

## 2022-07-17 LAB
SARS-COV-2 RNA RESP QL NAA+PROBE: NOT DETECTED
SERVICE CMNT-IMP: NORMAL
SERVICE CMNT-IMP: NORMAL

## 2022-07-18 ENCOUNTER — OFFICE VISIT (OUTPATIENT)
Dept: SLEEP MEDICINE | Age: 77
End: 2022-07-18

## 2022-07-18 DIAGNOSIS — Z91.89 RISK FACTORS FOR OBSTRUCTIVE SLEEP APNEA: ICD-10-CM

## 2022-07-18 DIAGNOSIS — G47.20 DYSFUNCTIONS OF SLEEP STAGES OR AROUSAL FROM SLEEP: ICD-10-CM

## 2022-07-18 DIAGNOSIS — R06.83 SNORING: ICD-10-CM

## 2022-07-18 DIAGNOSIS — G47.33 OSA (OBSTRUCTIVE SLEEP APNEA): Primary | ICD-10-CM

## 2022-07-18 DIAGNOSIS — G47.10 HYPERSOMNIA, UNSPECIFIED: ICD-10-CM

## 2022-07-18 LAB — REPORT TEXT: NORMAL

## 2022-07-18 PROCEDURE — 95811 POLYSOM 6/>YRS CPAP 4/> PARM: CPT | Performed by: SPECIALIST

## 2022-07-21 ENCOUNTER — HOSPITAL ENCOUNTER (OUTPATIENT)
Dept: CV DIAGNOSTICS | Facility: HOSPITAL | Age: 77
Discharge: HOME | End: 2022-07-21
Attending: INTERNAL MEDICINE
Payer: MEDICARE

## 2022-07-21 DIAGNOSIS — I28.8 DILATION OF PULMONARY ARTERY (HCC): ICD-10-CM

## 2022-07-21 DIAGNOSIS — R06.00 DYSPNEA, UNSPECIFIED TYPE: ICD-10-CM

## 2022-07-21 PROCEDURE — 93306 TTE W/DOPPLER COMPLETE: CPT | Performed by: INTERNAL MEDICINE

## 2022-08-03 ENCOUNTER — OFFICE VISIT (OUTPATIENT)
Dept: SLEEP MEDICINE | Age: 77
End: 2022-08-03

## 2022-08-03 VITALS
HEART RATE: 58 BPM | HEIGHT: 69 IN | DIASTOLIC BLOOD PRESSURE: 72 MMHG | OXYGEN SATURATION: 92 % | SYSTOLIC BLOOD PRESSURE: 132 MMHG | WEIGHT: 240.52 LBS | TEMPERATURE: 96.3 F | BODY MASS INDEX: 35.62 KG/M2

## 2022-08-03 DIAGNOSIS — G47.10 HYPERSOMNIA: ICD-10-CM

## 2022-08-03 DIAGNOSIS — G47.33 OSA (OBSTRUCTIVE SLEEP APNEA): Primary | ICD-10-CM

## 2022-08-03 PROCEDURE — 99214 OFFICE O/P EST MOD 30 MIN: CPT | Performed by: NURSE PRACTITIONER

## 2022-08-26 ENCOUNTER — OFFICE VISIT (OUTPATIENT)
Dept: INTERNAL MEDICINE CLINIC | Facility: CLINIC | Age: 77
End: 2022-08-26
Payer: MEDICARE

## 2022-08-26 VITALS
HEART RATE: 60 BPM | HEIGHT: 69 IN | SYSTOLIC BLOOD PRESSURE: 146 MMHG | BODY MASS INDEX: 35.25 KG/M2 | TEMPERATURE: 97 F | WEIGHT: 238 LBS | DIASTOLIC BLOOD PRESSURE: 78 MMHG | OXYGEN SATURATION: 96 %

## 2022-08-26 DIAGNOSIS — G62.9 PERIPHERAL POLYNEUROPATHY: ICD-10-CM

## 2022-08-26 DIAGNOSIS — F32.A DEPRESSION, UNSPECIFIED DEPRESSION TYPE: Primary | ICD-10-CM

## 2022-08-26 DIAGNOSIS — I51.89 DIASTOLIC DYSFUNCTION: ICD-10-CM

## 2022-08-26 DIAGNOSIS — G47.33 OSA (OBSTRUCTIVE SLEEP APNEA): ICD-10-CM

## 2022-08-26 DIAGNOSIS — N40.0 PROSTATISM: ICD-10-CM

## 2022-08-26 DIAGNOSIS — Z12.5 SCREENING PSA (PROSTATE SPECIFIC ANTIGEN): ICD-10-CM

## 2022-08-26 DIAGNOSIS — I10 BENIGN HYPERTENSION: ICD-10-CM

## 2022-08-26 DIAGNOSIS — K21.9 GASTROESOPHAGEAL REFLUX DISEASE WITHOUT ESOPHAGITIS: ICD-10-CM

## 2022-08-26 DIAGNOSIS — R60.0 BILATERAL LOWER EXTREMITY EDEMA: ICD-10-CM

## 2022-08-26 DIAGNOSIS — R06.00 DYSPNEA, UNSPECIFIED TYPE: ICD-10-CM

## 2022-08-26 PROCEDURE — 99214 OFFICE O/P EST MOD 30 MIN: CPT | Performed by: INTERNAL MEDICINE

## 2022-08-26 PROCEDURE — 1126F AMNT PAIN NOTED NONE PRSNT: CPT | Performed by: INTERNAL MEDICINE

## 2022-09-16 ENCOUNTER — MED REC SCAN ONLY (OUTPATIENT)
Dept: INTERNAL MEDICINE CLINIC | Facility: CLINIC | Age: 77
End: 2022-09-16

## 2022-09-22 ENCOUNTER — OFFICE VISIT (OUTPATIENT)
Dept: PULMONOLOGY | Facility: CLINIC | Age: 77
End: 2022-09-22

## 2022-09-22 VITALS
OXYGEN SATURATION: 93 % | DIASTOLIC BLOOD PRESSURE: 71 MMHG | BODY MASS INDEX: 35.25 KG/M2 | HEART RATE: 61 BPM | WEIGHT: 238 LBS | RESPIRATION RATE: 17 BRPM | HEIGHT: 69 IN | SYSTOLIC BLOOD PRESSURE: 131 MMHG

## 2022-09-22 DIAGNOSIS — R91.8 PULMONARY INFILTRATES: Primary | ICD-10-CM

## 2022-09-22 PROCEDURE — 99213 OFFICE O/P EST LOW 20 MIN: CPT | Performed by: INTERNAL MEDICINE

## 2022-09-22 PROCEDURE — 1126F AMNT PAIN NOTED NONE PRSNT: CPT | Performed by: INTERNAL MEDICINE

## 2022-10-11 ENCOUNTER — PATIENT MESSAGE (OUTPATIENT)
Dept: PULMONOLOGY | Facility: CLINIC | Age: 77
End: 2022-10-11

## 2022-10-12 NOTE — TELEPHONE ENCOUNTER
From: Graeme Beauchamp  To: Claudia Vargas MD  Sent: 10/11/2022 4:46 PM CDT  Subject: CT Scan    I am scheduled for a CT scan on Thursday 10/13 at 12:30 at [facility] ordered by Dr. Madison Gonzales . My question is will this be a scan with infusion requiring a two hour food and liquid fast or just a normal CT scan without restrictions?   Thank You

## 2022-10-13 ENCOUNTER — HOSPITAL ENCOUNTER (OUTPATIENT)
Dept: CT IMAGING | Facility: HOSPITAL | Age: 77
Discharge: HOME | End: 2022-10-13
Attending: INTERNAL MEDICINE
Payer: MEDICARE

## 2022-10-13 ENCOUNTER — TELEPHONE (OUTPATIENT)
Dept: PULMONOLOGY | Facility: CLINIC | Age: 77
End: 2022-10-13

## 2022-10-13 DIAGNOSIS — R91.8 PULMONARY INFILTRATES: ICD-10-CM

## 2022-10-13 LAB
CREAT BLD-MCNC: 1.1 MG/DL
GFR SERPLBLD BASED ON 1.73 SQ M-ARVRAT: 70 ML/MIN/1.73M2 (ref 60–?)

## 2022-10-13 PROCEDURE — 82565 ASSAY OF CREATININE: CPT

## 2022-10-13 PROCEDURE — 71260 CT THORAX DX C+: CPT | Performed by: INTERNAL MEDICINE

## 2022-10-13 NOTE — TELEPHONE ENCOUNTER
----- Message from Jahaira Pastor sent at 10/13/2022  2:36 PM CDT -----  Regarding: CT Scan  Dr. Jeny Del Valle said I should call or notify him when I have received the CT scan which I did earlier today. It appears, based on the results posted to My Chart, that, thankfully, there were no major abnormalities.

## 2022-10-13 NOTE — TELEPHONE ENCOUNTER
Dr. Joann Squires- patient states you requested that he inform you when he completed CT chest, which he had today. Inquiring about results.

## 2022-12-05 ENCOUNTER — OFFICE VISIT (OUTPATIENT)
Dept: SLEEP MEDICINE | Age: 77
End: 2022-12-05

## 2022-12-05 VITALS
BODY MASS INDEX: 35.36 KG/M2 | OXYGEN SATURATION: 97 % | TEMPERATURE: 97.5 F | DIASTOLIC BLOOD PRESSURE: 70 MMHG | WEIGHT: 238.76 LBS | HEIGHT: 69 IN | SYSTOLIC BLOOD PRESSURE: 130 MMHG | HEART RATE: 53 BPM

## 2022-12-05 DIAGNOSIS — G47.33 OSA (OBSTRUCTIVE SLEEP APNEA): Primary | ICD-10-CM

## 2022-12-05 PROCEDURE — 99214 OFFICE O/P EST MOD 30 MIN: CPT | Performed by: NURSE PRACTITIONER

## 2022-12-05 RX ORDER — BUPROPION HYDROCHLORIDE 150 MG/1
150 TABLET, EXTENDED RELEASE ORAL 2 TIMES DAILY
COMMUNITY
Start: 2022-07-15

## 2022-12-05 ASSESSMENT — ENCOUNTER SYMPTOMS
ALLERGIC/IMMUNOLOGIC NEGATIVE: 1
RESPIRATORY NEGATIVE: 1
ENDOCRINE NEGATIVE: 1
GASTROINTESTINAL NEGATIVE: 1
CONSTITUTIONAL NEGATIVE: 1
NEUROLOGICAL NEGATIVE: 1
EYES NEGATIVE: 1
PSYCHIATRIC NEGATIVE: 1

## 2022-12-12 RX ORDER — PERINDOPRIL ERBUMINE 4 MG/1
TABLET ORAL
Qty: 90 TABLET | Refills: 3 | OUTPATIENT
Start: 2022-12-12

## 2022-12-12 RX ORDER — METOPROLOL SUCCINATE 25 MG/1
TABLET, EXTENDED RELEASE ORAL
Qty: 90 TABLET | Refills: 3 | OUTPATIENT
Start: 2022-12-12

## 2022-12-12 RX ORDER — ESOMEPRAZOLE MAGNESIUM 40 MG/1
CAPSULE, DELAYED RELEASE ORAL
Qty: 90 CAPSULE | Refills: 3 | OUTPATIENT
Start: 2022-12-12

## 2023-01-26 RX ORDER — BUPROPION HYDROCHLORIDE 150 MG/1
150 TABLET, EXTENDED RELEASE ORAL 2 TIMES DAILY
Qty: 180 TABLET | Refills: 3 | Status: SHIPPED | OUTPATIENT
Start: 2023-01-26

## 2023-01-27 RX ORDER — BUPROPION HYDROCHLORIDE 150 MG/1
150 TABLET, EXTENDED RELEASE ORAL 2 TIMES DAILY
Qty: 60 TABLET | Refills: 5 | OUTPATIENT
Start: 2023-01-27

## 2023-01-29 RX ORDER — ALFUZOSIN HYDROCHLORIDE 10 MG/1
TABLET, EXTENDED RELEASE ORAL
Qty: 90 TABLET | Refills: 3 | Status: SHIPPED | OUTPATIENT
Start: 2023-01-29

## 2023-02-01 ENCOUNTER — OFFICE VISIT (OUTPATIENT)
Dept: INTERNAL MEDICINE CLINIC | Facility: CLINIC | Age: 78
End: 2023-02-01

## 2023-02-01 VITALS
SYSTOLIC BLOOD PRESSURE: 142 MMHG | OXYGEN SATURATION: 95 % | HEIGHT: 69 IN | DIASTOLIC BLOOD PRESSURE: 78 MMHG | BODY MASS INDEX: 36.29 KG/M2 | WEIGHT: 245 LBS | HEART RATE: 59 BPM | TEMPERATURE: 98 F

## 2023-02-01 DIAGNOSIS — S46.812A STRAIN OF LEFT TRAPEZIUS MUSCLE, INITIAL ENCOUNTER: Primary | ICD-10-CM

## 2023-02-01 PROCEDURE — 99213 OFFICE O/P EST LOW 20 MIN: CPT | Performed by: INTERNAL MEDICINE

## 2023-02-01 PROCEDURE — 1125F AMNT PAIN NOTED PAIN PRSNT: CPT | Performed by: INTERNAL MEDICINE

## 2023-02-09 RX ORDER — FUROSEMIDE 20 MG/1
TABLET ORAL
Qty: 90 TABLET | Refills: 3 | Status: SHIPPED | OUTPATIENT
Start: 2023-02-09

## 2023-02-17 ENCOUNTER — OFFICE VISIT (OUTPATIENT)
Dept: INTERNAL MEDICINE CLINIC | Facility: CLINIC | Age: 78
End: 2023-02-17

## 2023-02-17 VITALS
BODY MASS INDEX: 36.66 KG/M2 | HEIGHT: 69 IN | DIASTOLIC BLOOD PRESSURE: 90 MMHG | SYSTOLIC BLOOD PRESSURE: 158 MMHG | WEIGHT: 247.5 LBS | HEART RATE: 63 BPM

## 2023-02-17 DIAGNOSIS — I10 BENIGN HYPERTENSION: ICD-10-CM

## 2023-02-17 DIAGNOSIS — N40.0 PROSTATISM: ICD-10-CM

## 2023-02-17 DIAGNOSIS — E66.9 OBESITY, CLASS II, BMI 35-39.9: ICD-10-CM

## 2023-02-17 DIAGNOSIS — M16.11 PRIMARY OSTEOARTHRITIS OF RIGHT HIP: ICD-10-CM

## 2023-02-17 DIAGNOSIS — K21.9 GASTROESOPHAGEAL REFLUX DISEASE, UNSPECIFIED WHETHER ESOPHAGITIS PRESENT: ICD-10-CM

## 2023-02-17 DIAGNOSIS — Z00.00 PHYSICAL EXAM, ANNUAL: Primary | ICD-10-CM

## 2023-02-17 DIAGNOSIS — Z80.0 FAMILY HISTORY OF COLON CANCER: ICD-10-CM

## 2023-02-17 DIAGNOSIS — K57.90 DIVERTICULOSIS: ICD-10-CM

## 2023-02-17 DIAGNOSIS — R06.00 DYSPNEA, UNSPECIFIED TYPE: ICD-10-CM

## 2023-02-17 DIAGNOSIS — I28.8 DILATION OF PULMONARY ARTERY (HCC): ICD-10-CM

## 2023-02-17 DIAGNOSIS — F32.A DEPRESSION, UNSPECIFIED DEPRESSION TYPE: ICD-10-CM

## 2023-02-17 DIAGNOSIS — M54.12 CERVICAL RADICULOPATHY: ICD-10-CM

## 2023-02-17 DIAGNOSIS — K21.9 GASTROESOPHAGEAL REFLUX DISEASE WITHOUT ESOPHAGITIS: ICD-10-CM

## 2023-02-17 DIAGNOSIS — N52.9 ERECTILE DYSFUNCTION, UNSPECIFIED ERECTILE DYSFUNCTION TYPE: ICD-10-CM

## 2023-02-17 DIAGNOSIS — G62.9 PERIPHERAL POLYNEUROPATHY: ICD-10-CM

## 2023-02-17 DIAGNOSIS — M47.816 SPONDYLOSIS OF LUMBAR REGION WITHOUT MYELOPATHY OR RADICULOPATHY: ICD-10-CM

## 2023-02-17 DIAGNOSIS — R60.0 BILATERAL LOWER EXTREMITY EDEMA: ICD-10-CM

## 2023-02-17 DIAGNOSIS — G47.33 OSA (OBSTRUCTIVE SLEEP APNEA): ICD-10-CM

## 2023-02-17 DIAGNOSIS — Z12.5 SCREENING PSA (PROSTATE SPECIFIC ANTIGEN): ICD-10-CM

## 2023-02-17 DIAGNOSIS — G47.00 INSOMNIA, UNSPECIFIED TYPE: ICD-10-CM

## 2023-02-17 DIAGNOSIS — I51.89 DIASTOLIC DYSFUNCTION: ICD-10-CM

## 2023-02-17 RX ORDER — TADALAFIL 10 MG/1
10 TABLET ORAL
Qty: 24 TABLET | Refills: 3 | Status: SHIPPED | OUTPATIENT
Start: 2023-02-17

## 2023-02-17 RX ORDER — PERINDOPRIL ERBUMINE 4 MG/1
4 TABLET ORAL
Qty: 90 TABLET | Refills: 3 | Status: SHIPPED | OUTPATIENT
Start: 2023-02-17

## 2023-02-17 RX ORDER — METHYLPREDNISOLONE 4 MG/1
TABLET ORAL
Qty: 21 TABLET | Refills: 0 | Status: SHIPPED | OUTPATIENT
Start: 2023-02-17

## 2023-02-17 RX ORDER — METOPROLOL SUCCINATE 25 MG/1
25 TABLET, EXTENDED RELEASE ORAL DAILY
Qty: 90 TABLET | Refills: 3 | Status: SHIPPED | OUTPATIENT
Start: 2023-02-17

## 2023-02-17 RX ORDER — ESOMEPRAZOLE MAGNESIUM 40 MG/1
40 CAPSULE, DELAYED RELEASE ORAL
Qty: 90 CAPSULE | Refills: 3 | Status: SHIPPED | OUTPATIENT
Start: 2023-02-17

## 2023-02-17 RX ORDER — PREGABALIN 75 MG/1
CAPSULE ORAL
Qty: 60 CAPSULE | Refills: 5 | Status: SHIPPED | OUTPATIENT
Start: 2023-02-17

## 2023-02-25 ENCOUNTER — HOSPITAL ENCOUNTER (OUTPATIENT)
Dept: MRI IMAGING | Facility: HOSPITAL | Age: 78
Discharge: HOME | End: 2023-02-25
Attending: INTERNAL MEDICINE
Payer: MEDICARE

## 2023-02-25 DIAGNOSIS — M54.12 CERVICAL RADICULOPATHY: ICD-10-CM

## 2023-02-25 PROCEDURE — 72141 MRI NECK SPINE W/O DYE: CPT | Performed by: INTERNAL MEDICINE

## 2023-02-27 ENCOUNTER — TELEPHONE (OUTPATIENT)
Dept: INTERNAL MEDICINE CLINIC | Facility: CLINIC | Age: 78
End: 2023-02-27

## 2023-03-22 ENCOUNTER — TELEPHONE (OUTPATIENT)
Dept: INTERNAL MEDICINE CLINIC | Facility: CLINIC | Age: 78
End: 2023-03-22

## 2023-03-22 ENCOUNTER — PATIENT MESSAGE (OUTPATIENT)
Dept: INTERNAL MEDICINE CLINIC | Facility: CLINIC | Age: 78
End: 2023-03-22

## 2023-03-22 NOTE — TELEPHONE ENCOUNTER
----- Message from Luda Ovalles sent at 3/22/2023  2:55 PM CDT -----  Regarding: semaglutide-weight management 1 MG/0.5ML Soaviva  Commonly  Contact: [phone]  Dr. Sarah Garcia prescribed a 3 month course of semaglutide-weight management injections, (0.25 MG, then 0.5 MG, and finally 1 MG). I have finished the first month prescription (0.25 MG) yesterday, 3/21 and need to renew the second month (0.5 MG). I tried doing it on the website , but it did not allow me to renew. Perhaps you can help.   Thank Rhoda Shelby

## 2023-03-22 NOTE — TELEPHONE ENCOUNTER
From: Brennan Sheets  To: Reji Stapleton MD  Sent: 3/22/2023 2:55 PM CDT  Subject: semaglutide-weight management 1 MG/0.5ML Soaj Commonly    Dr. Pepe Jalloh prescribed a 3 month course of semaglutide-weight management injections, (0.25 MG, then 0.5 MG, and finally 1 MG). I have finished the first month prescription (0.25 MG) yesterday, 3/21 and need to renew the second month (0.5 MG). I tried doing it on the website , but it did not allow me to renew. Perhaps you can help.   Thank Kathie Garcia

## 2023-03-23 NOTE — TELEPHONE ENCOUNTER
ERx sent to [facility] in [facility] for Ozempic 0.5 subcutaneous qWeek, #4 doses    Also postdated ERx for Ozempic 1 mg subcutaneous qweek for April 2023    Please notify pt

## 2023-03-23 NOTE — TELEPHONE ENCOUNTER
Spoke to patient and relayed MD message. Reiterated continuing to monitor blood glucose specially  With  increasing dose to 1 mg/week. Pt verbalized understanding and agrees with plan.

## 2023-03-29 ENCOUNTER — HOSPITAL ENCOUNTER (OUTPATIENT)
Dept: MRI IMAGING | Facility: HOSPITAL | Age: 78
Discharge: HOME | End: 2023-03-29
Attending: NEUROLOGICAL SURGERY
Payer: MEDICARE

## 2023-03-29 DIAGNOSIS — M54.50 LUMBAR BACK PAIN: ICD-10-CM

## 2023-03-29 PROCEDURE — 72148 MRI LUMBAR SPINE W/O DYE: CPT | Performed by: NEUROLOGICAL SURGERY

## 2023-04-14 ENCOUNTER — HOSPITAL ENCOUNTER (OUTPATIENT)
Dept: GENERAL RADIOLOGY | Facility: HOSPITAL | Age: 78
Discharge: HOME | End: 2023-04-14
Attending: ORTHOPAEDIC SURGERY
Payer: MEDICARE

## 2023-04-14 DIAGNOSIS — M25.552 PAIN OF LEFT HIP: ICD-10-CM

## 2023-04-14 DIAGNOSIS — Z96.641 HISTORY OF TOTAL HIP REPLACEMENT, RIGHT: ICD-10-CM

## 2023-04-14 DIAGNOSIS — M25.551 RIGHT HIP PAIN: ICD-10-CM

## 2023-04-14 PROCEDURE — 73502 X-RAY EXAM HIP UNI 2-3 VIEWS: CPT | Performed by: ORTHOPAEDIC SURGERY

## 2023-04-25 RX ORDER — PREGABALIN 75 MG/1
CAPSULE ORAL
Qty: 60 CAPSULE | Refills: 5 | OUTPATIENT
Start: 2023-04-25

## 2023-05-20 ENCOUNTER — TELEPHONE (OUTPATIENT)
Dept: INTERNAL MEDICINE CLINIC | Facility: CLINIC | Age: 78
End: 2023-05-20

## 2023-05-22 RX ORDER — PREGABALIN 75 MG/1
CAPSULE ORAL
Qty: 60 CAPSULE | Refills: 5 | Status: SHIPPED | OUTPATIENT
Start: 2023-05-22

## 2023-05-22 NOTE — TELEPHONE ENCOUNTER
To MD:  The above refill request is for a controlled substance. Please review pended medication order. Print and sign for staff to fax to pharmacy or prescribe electronically.     Last office visit: 2/17/23  Last time refill sent and quantity/refills:   Dispensed Written Strength Quantity Refills Days Supply Provider Pharmacy   PREGABALIN 04/25/2023 02/17/2023 75 mg 60  Abby Carbajal MD [facility]

## 2023-05-25 ENCOUNTER — LAB ENCOUNTER (OUTPATIENT)
Dept: LAB | Age: 78
End: 2023-05-25
Attending: INTERNAL MEDICINE
Payer: MEDICARE

## 2023-05-25 ENCOUNTER — OFFICE VISIT (OUTPATIENT)
Dept: INTERNAL MEDICINE CLINIC | Facility: CLINIC | Age: 78
End: 2023-05-25

## 2023-05-25 VITALS
HEART RATE: 64 BPM | TEMPERATURE: 98 F | HEIGHT: 69 IN | SYSTOLIC BLOOD PRESSURE: 150 MMHG | WEIGHT: 242 LBS | BODY MASS INDEX: 35.84 KG/M2 | DIASTOLIC BLOOD PRESSURE: 70 MMHG

## 2023-05-25 DIAGNOSIS — Z01.810 ENCOUNTER FOR PRE-OPERATIVE CARDIOVASCULAR CLEARANCE: ICD-10-CM

## 2023-05-25 DIAGNOSIS — M54.12 CERVICAL RADICULOPATHY: ICD-10-CM

## 2023-05-25 DIAGNOSIS — K21.9 GASTROESOPHAGEAL REFLUX DISEASE WITHOUT ESOPHAGITIS: ICD-10-CM

## 2023-05-25 DIAGNOSIS — F32.A DEPRESSION, UNSPECIFIED DEPRESSION TYPE: ICD-10-CM

## 2023-05-25 DIAGNOSIS — I10 BENIGN HYPERTENSION: ICD-10-CM

## 2023-05-25 DIAGNOSIS — E66.9 OBESITY, CLASS II, BMI 35-39.9: ICD-10-CM

## 2023-05-25 DIAGNOSIS — G47.33 OSA (OBSTRUCTIVE SLEEP APNEA): ICD-10-CM

## 2023-05-25 DIAGNOSIS — R60.0 BILATERAL LOWER EXTREMITY EDEMA: ICD-10-CM

## 2023-05-25 DIAGNOSIS — G62.9 PERIPHERAL POLYNEUROPATHY: ICD-10-CM

## 2023-05-25 DIAGNOSIS — M16.12 PRIMARY OSTEOARTHRITIS OF LEFT HIP: Primary | ICD-10-CM

## 2023-05-25 LAB
ALBUMIN SERPL-MCNC: 3.7 G/DL (ref 3.4–5)
ALBUMIN/GLOB SERPL: 1 {RATIO} (ref 1–2)
ALP LIVER SERPL-CCNC: 85 U/L
ALT SERPL-CCNC: 80 U/L
ANION GAP SERPL CALC-SCNC: 2 MMOL/L (ref 0–18)
AST SERPL-CCNC: 49 U/L (ref 15–37)
ATRIAL RATE: 62 BPM
BASOPHILS # BLD AUTO: 0.04 X10(3) UL (ref 0–0.2)
BASOPHILS NFR BLD AUTO: 0.4 %
BILIRUB SERPL-MCNC: 0.6 MG/DL (ref 0.1–2)
BILIRUB UR QL: NEGATIVE
BUN BLD-MCNC: 16 MG/DL (ref 7–18)
BUN/CREAT SERPL: 14.5 (ref 10–20)
CALCIUM BLD-MCNC: 8.5 MG/DL (ref 8.5–10.1)
CHLORIDE SERPL-SCNC: 109 MMOL/L (ref 98–112)
CLARITY UR: CLEAR
CO2 SERPL-SCNC: 28 MMOL/L (ref 21–32)
CREAT BLD-MCNC: 1.1 MG/DL
DEPRECATED RDW RBC AUTO: 39.2 FL (ref 35.1–46.3)
EOSINOPHIL # BLD AUTO: 0.13 X10(3) UL (ref 0–0.7)
EOSINOPHIL NFR BLD AUTO: 1.2 %
ERYTHROCYTE [DISTWIDTH] IN BLOOD BY AUTOMATED COUNT: 11.1 % (ref 11–15)
FASTING STATUS PATIENT QL REPORTED: YES
GFR SERPLBLD BASED ON 1.73 SQ M-ARVRAT: 70 ML/MIN/1.73M2 (ref 60–?)
GLOBULIN PLAS-MCNC: 3.7 G/DL (ref 2.8–4.4)
GLUCOSE BLD-MCNC: 109 MG/DL (ref 70–99)
GLUCOSE UR-MCNC: NORMAL MG/DL
HCT VFR BLD AUTO: 43.2 %
HGB BLD-MCNC: 15.1 G/DL
HGB UR QL STRIP.AUTO: NEGATIVE
IMM GRANULOCYTES # BLD AUTO: 0.04 X10(3) UL (ref 0–1)
IMM GRANULOCYTES NFR BLD: 0.4 %
KETONES UR-MCNC: NEGATIVE MG/DL
LEUKOCYTE ESTERASE UR QL STRIP.AUTO: NEGATIVE
LYMPHOCYTES # BLD AUTO: 1.93 X10(3) UL (ref 1–4)
LYMPHOCYTES NFR BLD AUTO: 18.1 %
MCH RBC QN AUTO: 33.5 PG (ref 26–34)
MCHC RBC AUTO-ENTMCNC: 35 G/DL (ref 31–37)
MCV RBC AUTO: 95.8 FL
MONOCYTES # BLD AUTO: 0.75 X10(3) UL (ref 0.1–1)
MONOCYTES NFR BLD AUTO: 7 %
NEUTROPHILS # BLD AUTO: 7.75 X10 (3) UL (ref 1.5–7.7)
NEUTROPHILS # BLD AUTO: 7.75 X10(3) UL (ref 1.5–7.7)
NEUTROPHILS NFR BLD AUTO: 72.9 %
NITRITE UR QL STRIP.AUTO: NEGATIVE
OSMOLALITY SERPL CALC.SUM OF ELEC: 290 MOSM/KG (ref 275–295)
P AXIS: 57 DEGREES
P-R INTERVAL: 196 MS
PH UR: 5.5 [PH] (ref 5–8)
PLATELET # BLD AUTO: 240 10(3)UL (ref 150–450)
POTASSIUM SERPL-SCNC: 4 MMOL/L (ref 3.5–5.1)
PROT SERPL-MCNC: 7.4 G/DL (ref 6.4–8.2)
PROT UR-MCNC: 50 MG/DL
Q-T INTERVAL: 432 MS
QRS DURATION: 104 MS
QTC CALCULATION (BEZET): 438 MS
R AXIS: -31 DEGREES
RBC # BLD AUTO: 4.51 X10(6)UL
SODIUM SERPL-SCNC: 139 MMOL/L (ref 136–145)
SP GR UR STRIP: 1.02 (ref 1–1.03)
T AXIS: -5 DEGREES
UROBILINOGEN UR STRIP-ACNC: NORMAL
VENTRICULAR RATE: 62 BPM
WBC # BLD AUTO: 10.6 X10(3) UL (ref 4–11)

## 2023-05-25 PROCEDURE — 1126F AMNT PAIN NOTED NONE PRSNT: CPT | Performed by: INTERNAL MEDICINE

## 2023-05-25 PROCEDURE — 99214 OFFICE O/P EST MOD 30 MIN: CPT | Performed by: INTERNAL MEDICINE

## 2023-05-25 PROCEDURE — 93000 ELECTROCARDIOGRAM COMPLETE: CPT | Performed by: INTERNAL MEDICINE

## 2023-05-25 PROCEDURE — 85025 COMPLETE CBC W/AUTO DIFF WBC: CPT

## 2023-05-25 PROCEDURE — 81001 URINALYSIS AUTO W/SCOPE: CPT | Performed by: INTERNAL MEDICINE

## 2023-05-25 PROCEDURE — 36415 COLL VENOUS BLD VENIPUNCTURE: CPT

## 2023-05-25 PROCEDURE — 80053 COMPREHEN METABOLIC PANEL: CPT

## 2023-06-05 ENCOUNTER — PATIENT MESSAGE (OUTPATIENT)
Dept: INTERNAL MEDICINE CLINIC | Facility: CLINIC | Age: 78
End: 2023-06-05

## 2023-06-06 ENCOUNTER — TELEPHONE (OUTPATIENT)
Dept: INTERNAL MEDICINE CLINIC | Facility: CLINIC | Age: 78
End: 2023-06-06

## 2023-06-06 NOTE — TELEPHONE ENCOUNTER
Pre op OV note, Labs, EKG faxed to [phone]. Confirmation Received. Pt notified via [facility].

## 2023-06-06 NOTE — TELEPHONE ENCOUNTER
From: Karla Sheets  To: Chadd Lang MD  Sent: 6/5/2023 8:08 AM CDT  Subject: Upcoming hip replacement surgery June 7    Good morning,    Just received a phone call from Dr. Say Ferreira office indicating that they have the report from my May 25th visit with Dr. Marycarmen Rivas and subsequent lab work, but do not yet have the permission for surgery from Dr. Marycarmen Rivas. Would you be so kind as to have that faxed to Dr. Say Ferreira office today, since my surgery if scheduled for this Wednesday, June 7.     Thank you and hope you have a good Lauren Ravi

## 2023-06-06 NOTE — TELEPHONE ENCOUNTER
Pt. Called stating he saw Dr. Nash Carrillo on 5/25/23 for a pre-op clearance-  His surgery is tomorrow morning at 7:00A. M. He also had labs and a EKG done. The Surgeons office has not received his pre-op clearance or the labs or EKG. He needs this info sent there ASAP. Please fax those results to Dr. Aneesh Kelsey at [facility]. His fax# is [phone].

## 2023-06-28 ENCOUNTER — HOSPITAL ENCOUNTER (OUTPATIENT)
Dept: GENERAL RADIOLOGY | Facility: HOSPITAL | Age: 78
Discharge: HOME | End: 2023-06-28
Attending: ORTHOPAEDIC SURGERY
Payer: MEDICARE

## 2023-06-28 DIAGNOSIS — Z96.642 HISTORY OF TOTAL HIP REPLACEMENT, LEFT: ICD-10-CM

## 2023-06-28 PROCEDURE — 73502 X-RAY EXAM HIP UNI 2-3 VIEWS: CPT | Performed by: ORTHOPAEDIC SURGERY

## 2023-07-11 RX ORDER — TADALAFIL 10 MG/1
10 TABLET ORAL
Qty: 24 TABLET | Refills: 3 | OUTPATIENT
Start: 2023-07-11

## 2023-07-11 NOTE — TELEPHONE ENCOUNTER
Katlyn msg -- 3 refills left. Current refill request refused due to refill is either a duplicate request or has active refills at the pharmacy. Check previous templates. Requested Prescriptions     Refused Prescriptions Disp Refills    Tadalafil 10 MG Oral Tab 24 tablet 3     Sig: Take 1 tablet (10 mg total) by mouth daily as needed for Erectile Dysfunction.

## 2023-07-26 ENCOUNTER — HOSPITAL ENCOUNTER (OUTPATIENT)
Dept: GENERAL RADIOLOGY | Facility: HOSPITAL | Age: 78
Discharge: HOME | End: 2023-07-26
Attending: PHYSICIAN ASSISTANT
Payer: MEDICARE

## 2023-07-26 DIAGNOSIS — Z96.642 HISTORY OF TOTAL HIP REPLACEMENT, LEFT: ICD-10-CM

## 2023-07-26 PROCEDURE — 73502 X-RAY EXAM HIP UNI 2-3 VIEWS: CPT | Performed by: PHYSICIAN ASSISTANT

## 2023-09-13 ENCOUNTER — HOSPITAL ENCOUNTER (OUTPATIENT)
Dept: GENERAL RADIOLOGY | Facility: HOSPITAL | Age: 78
Discharge: HOME | End: 2023-09-13
Attending: ORTHOPAEDIC SURGERY
Payer: MEDICARE

## 2023-09-13 DIAGNOSIS — Z96.642 HISTORY OF TOTAL HIP REPLACEMENT, LEFT: ICD-10-CM

## 2023-09-13 PROCEDURE — 73502 X-RAY EXAM HIP UNI 2-3 VIEWS: CPT | Performed by: ORTHOPAEDIC SURGERY

## 2023-09-15 ENCOUNTER — TELEPHONE (OUTPATIENT)
Dept: INTERNAL MEDICINE CLINIC | Facility: CLINIC | Age: 78
End: 2023-09-15

## 2023-11-07 ENCOUNTER — TELEPHONE (OUTPATIENT)
Dept: INTERNAL MEDICINE CLINIC | Facility: CLINIC | Age: 78
End: 2023-11-07

## 2023-11-07 NOTE — TELEPHONE ENCOUNTER
Spoke with patient states last Friday he started experiencing dizziness, difficulty speaking, inability to walk.  Went to [facility] yesterday had CT, EKG, and labs drawn.  Was there for 7 hours and left because he couldn't wait any longer. Patient wants to know what he should do now.  Denies hemiparesis, vision  changes chest pain, fever, ear pain or fullness. States symptoms have not worsened or gotten any better.   Advised patient have someone take him to ED for evaluation. Advised patient to stay until he is discharged by ED staff. Patient agreeable with recommendation.     Nursing FU 11/8

## 2023-11-07 NOTE — TELEPHONE ENCOUNTER
Patient went to the ER yesterday after experiencing   Dizziness, unable to walk across the room and having difficulty speaking.  Patient had to blood test and a CT.  Patient left after waiting for 7 hours and not getting to see a doctor yet.  Patient states he is experiencing the same symptoms now.

## 2023-11-08 NOTE — TELEPHONE ENCOUNTER
Spouse, Daniela is calling back. Patient did go to the ER yesterday and was admitted into the [facility].    Any additional questions, please call Daniela on her cellphone at Ph # [phone]

## 2023-11-13 ENCOUNTER — TELEPHONE (OUTPATIENT)
Dept: INTERNAL MEDICINE CLINIC | Facility: CLINIC | Age: 78
End: 2023-11-13

## 2023-11-30 RX ORDER — ESOMEPRAZOLE MAGNESIUM 40 MG/1
40 CAPSULE, DELAYED RELEASE ORAL
Qty: 90 CAPSULE | Refills: 3 | OUTPATIENT
Start: 2023-11-30

## 2023-11-30 RX ORDER — METOPROLOL SUCCINATE 25 MG/1
25 TABLET, EXTENDED RELEASE ORAL DAILY
Qty: 90 TABLET | Refills: 3 | OUTPATIENT
Start: 2023-11-30

## 2023-11-30 RX ORDER — PERINDOPRIL ERBUMINE 4 MG/1
4 TABLET ORAL DAILY
Qty: 90 TABLET | Refills: 3 | OUTPATIENT
Start: 2023-11-30

## 2023-11-30 NOTE — TELEPHONE ENCOUNTER
Current refill request refused due to refill is either a duplicate request or has active refills at the pharmacy. Check previous templates.     Requested Prescriptions     Refused Prescriptions Disp Refills    PERINDOPRIL ERBUMINE 4 MG Oral Tab [Pharmacy Med Name: PERINDOPRIL ERBUMINE 4 MG Tablet] 90 tablet 3     Sig: TAKE 1 TABLET ONE TIME DAILY     Refused By: Deejay Yancey     Reason for Refusal: Request already responded to by other means (e.g. phone or fax)    METOPROLOL SUCCINATE ER 25 MG Oral Tablet 24 Hr [Pharmacy Med Name: METOPROLOL SUCCINATE ER 25 MG Tablet Extended Release 24 Hour] 90 tablet 3     Sig: TAKE 1 TABLET EVERY DAY     Refused By: Deejay Yancey     Reason for Refusal: Request already responded to by other means (e.g. phone or fax)    ESOMEPRAZOLE MAGNESIUM 40 MG Oral Capsule Delayed Release [Pharmacy Med Name: ESOMEPRAZOLE MAGNESIUM 40 MG Capsule Delayed Release] 90 capsule 3     Sig: TAKE 1 CAPSULE EVERY DAY BEFORE BREAKFAST     Refused By: Deejay Yancey     Reason for Refusal: Request already responded to by other means (e.g. phone or fax)

## 2023-12-04 ENCOUNTER — APPOINTMENT (OUTPATIENT)
Dept: SLEEP MEDICINE | Age: 78
End: 2023-12-04

## 2023-12-06 NOTE — TELEPHONE ENCOUNTER
Spouse, Мария Kirk is calling to speak with a nurse. Patient has a stroke on November 8th and admitted to [facility]. Patient was then in rehab. Patient is now going to start Outpatient Rehab with Griselda Stain. Spouse, Мария Reagan wanted to make sure Dr Jonathan Cardoso was aware of this and to please send any medical information requested to them. Griselda Stain should be faxing over requests to Dr Jonathan Cardoso. CARMEN.

## 2023-12-18 ENCOUNTER — TELEPHONE (OUTPATIENT)
Dept: INTERNAL MEDICINE CLINIC | Facility: CLINIC | Age: 78
End: 2023-12-18

## 2023-12-18 NOTE — TELEPHONE ENCOUNTER
Patient's wife Мария Reagan is calling patient had a stroke 6 weeks ago, he needs to do a rehab follow up    She said it is very difficult to get him out of the house, can patient do a virtual visit for the follow up?     Please call home [phone] if not home cell [phone]

## 2023-12-19 ENCOUNTER — PATIENT MESSAGE (OUTPATIENT)
Dept: INTERNAL MEDICINE CLINIC | Facility: CLINIC | Age: 78
End: 2023-12-19

## 2023-12-19 NOTE — TELEPHONE ENCOUNTER
Wife Graciela Gaming calling. Tried to make phone visit for patient. Dr. Magda Mueller has no openings that work for them. Patient is home all day Wednesday. Thursday he has therapy will not be home between 11:30am-4:00pm.  Wife does not want to wait until next week. Please advise. Wife is sending Kijubi message with changes in medications from recent hospitalization.     Best call back number [phone]

## 2023-12-20 RX ORDER — LISINOPRIL 10 MG/1
10 TABLET ORAL DAILY
COMMUNITY
Start: 2023-11-10

## 2023-12-20 RX ORDER — ATORVASTATIN CALCIUM 40 MG/1
40 TABLET, FILM COATED ORAL NIGHTLY
COMMUNITY
Start: 2023-11-10

## 2023-12-20 RX ORDER — CARVEDILOL 6.25 MG/1
6.25 TABLET ORAL 2 TIMES DAILY
COMMUNITY
Start: 2023-12-07

## 2023-12-20 RX ORDER — MIRTAZAPINE 45 MG/1
45 TABLET, FILM COATED ORAL NIGHTLY
COMMUNITY
Start: 2023-12-07

## 2023-12-20 RX ORDER — NIFEDIPINE 30 MG/1
60 TABLET, FILM COATED, EXTENDED RELEASE ORAL DAILY
COMMUNITY
Start: 2023-11-10 | End: 2023-12-21

## 2023-12-20 RX ORDER — CLOPIDOGREL BISULFATE 75 MG/1
75 TABLET ORAL DAILY
COMMUNITY
Start: 2023-12-11

## 2023-12-20 RX ORDER — BACLOFEN 10 MG/1
5 TABLET ORAL NIGHTLY
COMMUNITY
Start: 2023-12-07

## 2023-12-20 RX ORDER — ASPIRIN 81 MG/1
81 TABLET ORAL DAILY
COMMUNITY

## 2023-12-20 NOTE — TELEPHONE ENCOUNTER
From: Silver Sheets  To:  Blanka Zheng  Sent: 12/19/2023 9:56 AM CST  Subject: Medications     Baclofen 0.5 tab at bedtime  Carvedilol 6.25mg 2 times a day  Mirtazapine 45mg at bedtime  Alfuzosin 10mg 1per day  Aspirin 81mg 1perday  Atorvastatin 40mg 1tab at bedtime  Plavix 75mg 1 tab per day  Esomeprazole 40mg 1 tab per day  Lisinopril 10mg 1 tab per day  Nifedipine 30mg 2 tab per day  Wegovy 2.4 mg    This is the medication list as of December 8 at [facility]

## 2023-12-20 NOTE — TELEPHONE ENCOUNTER
From: Miguel Angel Sheets  To: Mike Arnett  Sent: 12/19/2023  9:56 AM CST  Subject: Medications    Baclofen    0.5 tab at bedtime  Carvedilol 6.25mg 2 times a day  Mirtazapine 45mg at bedtime  Alfuzosin 10mg 1per day  Aspirin 81mg 1perday  Atorvastatin 40mg 1tab at bedtime  Plavix 75mg 1 tab per day  Esomeprazole 40mg 1 tab per day  Lisinopril 10mg 1 tab per day  Nifedipine 30mg 2 tab per day  Wegovy 2.4 mg    This is the medication list as of December 8 at discharge from [facility] in patient [facility]       To Dr. Marla Almanzar to original message below and advise. Medications list reconciled/updated

## 2023-12-21 ENCOUNTER — TELEPHONE (OUTPATIENT)
Dept: INTERNAL MEDICINE CLINIC | Facility: CLINIC | Age: 78
End: 2023-12-21

## 2023-12-21 ENCOUNTER — VIRTUAL PHONE E/M (OUTPATIENT)
Dept: INTERNAL MEDICINE CLINIC | Facility: CLINIC | Age: 78
End: 2023-12-21

## 2023-12-21 DIAGNOSIS — F32.A DEPRESSION, UNSPECIFIED DEPRESSION TYPE: ICD-10-CM

## 2023-12-21 DIAGNOSIS — I69.359 HEMIPARESIS DUE TO RECENT CEREBROVASCULAR ACCIDENT (CVA) (HCC): ICD-10-CM

## 2023-12-21 DIAGNOSIS — I63.9 CEREBROVASCULAR ACCIDENT (CVA), UNSPECIFIED MECHANISM (HCC): Primary | ICD-10-CM

## 2023-12-21 DIAGNOSIS — Z96.642 HISTORY OF LEFT HIP REPLACEMENT: ICD-10-CM

## 2023-12-21 PROCEDURE — 99443 PHONE E/M BY PHYS 21-30 MIN: CPT | Performed by: INTERNAL MEDICINE

## 2023-12-21 RX ORDER — NIFEDIPINE 30 MG/1
30 TABLET, FILM COATED, EXTENDED RELEASE ORAL 2 TIMES DAILY
COMMUNITY
Start: 2023-12-21

## 2023-12-21 RX ORDER — MIRTAZAPINE 15 MG/1
TABLET, FILM COATED ORAL
Qty: 21 TABLET | Refills: 0 | Status: SHIPPED | OUTPATIENT
Start: 2023-12-21

## 2023-12-21 NOTE — TELEPHONE ENCOUNTER
Pt. Called stating she has left several messages to speak with the nurse or  regarding updating [facility]  medication list and has not received a call back. Wife requesting a call back today.

## 2023-12-21 NOTE — TELEPHONE ENCOUNTER
See 12/18/2023 Telephone Encounter. Pt. Called stating she has left several messages to speak with the nurse or  regarding updating [facility]  medication list and has not received a call back. Wife requesting a call back today. Spoke with wife -- she is hoping Dr. Letha Ashby can call today to conduct a telephone visit. Will not be available until after 4 pm today.

## 2023-12-21 NOTE — TELEPHONE ENCOUNTER
Spoke with wife. Wife upset she hasn't heard back from the office.     New TE created (see 12/21 TE) and routed to MD.

## 2024-01-17 ENCOUNTER — TELEPHONE (OUTPATIENT)
Dept: INTERNAL MEDICINE CLINIC | Facility: CLINIC | Age: 79
End: 2024-01-17

## 2024-01-17 ENCOUNTER — OFFICE VISIT (OUTPATIENT)
Dept: INTERNAL MEDICINE CLINIC | Facility: CLINIC | Age: 79
End: 2024-01-17
Payer: MEDICARE

## 2024-01-17 VITALS
HEIGHT: 69 IN | WEIGHT: 237 LBS | BODY MASS INDEX: 35.1 KG/M2 | DIASTOLIC BLOOD PRESSURE: 68 MMHG | OXYGEN SATURATION: 94 % | HEART RATE: 57 BPM | SYSTOLIC BLOOD PRESSURE: 130 MMHG

## 2024-01-17 DIAGNOSIS — I63.9 CEREBROVASCULAR ACCIDENT (CVA), UNSPECIFIED MECHANISM (HCC): Primary | ICD-10-CM

## 2024-01-17 DIAGNOSIS — E78.00 HYPERCHOLESTEREMIA: ICD-10-CM

## 2024-01-17 DIAGNOSIS — R60.0 EDEMA OF RIGHT UPPER ARM: ICD-10-CM

## 2024-01-17 DIAGNOSIS — I10 BENIGN HYPERTENSION: ICD-10-CM

## 2024-01-17 DIAGNOSIS — R60.0 BILATERAL LOWER EXTREMITY EDEMA: ICD-10-CM

## 2024-01-17 DIAGNOSIS — F32.A DEPRESSION, UNSPECIFIED DEPRESSION TYPE: ICD-10-CM

## 2024-01-17 DIAGNOSIS — G62.9 PERIPHERAL POLYNEUROPATHY: ICD-10-CM

## 2024-01-17 DIAGNOSIS — I69.359 HEMIPARESIS DUE TO RECENT CEREBROVASCULAR ACCIDENT (CVA) (HCC): ICD-10-CM

## 2024-01-17 PROCEDURE — 99214 OFFICE O/P EST MOD 30 MIN: CPT | Performed by: INTERNAL MEDICINE

## 2024-01-17 PROCEDURE — 1125F AMNT PAIN NOTED PAIN PRSNT: CPT | Performed by: INTERNAL MEDICINE

## 2024-01-17 RX ORDER — LISINOPRIL 20 MG/1
20 TABLET ORAL DAILY
Qty: 90 TABLET | Refills: 3 | Status: SHIPPED | OUTPATIENT
Start: 2024-01-17

## 2024-01-17 RX ORDER — ACETAMINOPHEN 325 MG/1
325 TABLET ORAL EVERY 6 HOURS PRN
COMMUNITY

## 2024-01-17 RX ORDER — NIFEDIPINE 30 MG/1
30 TABLET, FILM COATED, EXTENDED RELEASE ORAL DAILY
Qty: 90 TABLET | Refills: 3 | Status: SHIPPED | OUTPATIENT
Start: 2024-01-17

## 2024-01-17 NOTE — PROGRESS NOTES
Bart Sheets is a 78 year old male.    HPI:   No chief complaint on file.      79 y/o M who suffered CVA on 23 when ; experienced dizziness, difficulty speaking, inability to walk; has RUE, and RLE paresis; has AFO orthotic; admitted to [facility], then 11/10-23-23 at [facility]; MRI imaging reportedly showed a CVA; sxs progressed; SBP was >200 upon presentation  -now on Plavix 75 mg po every day and ASA 81 mg po every day  -on baclofen 10 mg half-tab (=5 mg) po at bedtime  -seeing neurologist Dr Pranav Bartlett in 2024, and cardiologist Dr Cassandra Godwin in 2024  -he reports BLE edema and right hand swelling;  no CP; no SOB; speech is improving        HISTORY:  Past Medical History:   Diagnosis Date    ACL tear     left ACL tear s/p reconstruction: Ortho Dr. Holly - repair 3/27/12    Aortic sclerosis     mild AI    Depression     ED (erectile dysfunction)     Esophageal reflux     Hiatal hernia     History of ankle fracture     s/p casting     Lipid screening 2008    per  health maintenance PPD    Osteoarthritis of right hip     s/p right THR in 2019; orthopedics Dr Bender    Stroke (cerebrum) ([facility]) 2023      Past Surgical History:   Procedure Laterality Date    ELECTROCARDIOGRAM, COMPLETE  2012    scanned to media tab    HIP REPLACEMENT SURGERY Right 2018    OTHER SURGICAL HISTORY  3/27/2012    ACL repair    OTHER SURGICAL HISTORY      s/p laparoscopic nissen fundoplication      Family History   Problem Relation Age of Onset    Diabetes Father         cause of death    Cancer Mother         colon      Social History:   Social History     Socioeconomic History    Marital status:    Tobacco Use    Smoking status: Former     Years: 30     Types: Cigarettes     Quit date: 2000     Years since quittin.0    Smokeless tobacco: Never   Vaping Use    Vaping Use: Never used   Substance and Sexual Activity    Alcohol use: Yes      Comment: wine, 1 glass daily    Drug use: No   Other Topics Concern    Caffeine Concern Yes     Comment: coffee/soda, 2 cups    Exercise Yes   Social History Narrative    The patient does not use an assistive device..      The patient does live in a home with stairs.        Medications (Active prior to today's visit):  Current Outpatient Medications   Medication Sig Dispense Refill    acetaminophen 325 MG Oral Tab Take 1 tablet (325 mg total) by mouth every 6 (six) hours as needed for Pain.      lisinopril 20 MG Oral Tab Take 1 tablet (20 mg total) by mouth daily. 90 tablet 3    NIFEdipine ER 30 MG Oral Tablet 24 Hr Take 1 tablet (30 mg total) by mouth daily. 90 tablet 3    miconazole 2 % External Powder ATAA BID 85 g 3    mirtazapine 15 MG Oral Tab Take 30 mg po at bedtime x7d, then 15 mg po at bedtime x7d, then stop 21 tablet 0    baclofen 10 MG Oral Tab Take 0.5 tablets (5 mg total) by mouth nightly.      carvedilol 6.25 MG Oral Tab Take 1 tablet (6.25 mg total) by mouth 2 (two) times daily.      mirtazapine 45 MG Oral Tab Take 1 tablet (45 mg total) by mouth nightly.      atorvastatin 40 MG Oral Tab Take 1 tablet (40 mg total) by mouth nightly.      aspirin 81 MG Oral Tab EC Take 1 tablet (81 mg total) by mouth daily.      clopidogrel 75 MG Oral Tab Take 1 tablet (75 mg total) by mouth daily.      Esomeprazole Magnesium 40 MG Oral Capsule Delayed Release Take 1 capsule (40 mg total) by mouth before breakfast. 90 capsule 3    ALFUZOSIN ER 10 MG Oral Tablet 24 Hr TAKE 1 TABLET EVERY DAY 90 tablet 3       Allergies:  No Known Allergies            ROS:   Constitutional: no weight loss; no fatigue  ENMT:  Negative for ear drainage, hearing loss and nasal drainage  Eyes:  Negative for eye discharge and vision loss  Cardiovascular:  Negative for chest pain; negative palpitations  Respiratory:  Negative for cough, dyspnea and wheezing  Endocrine:  Negative for abnormal sleep patterns, increased activity, polydipsia  and polyphagia  Gastrointestinal:  Negative for abdominal pain, constipation, decreased appetite, diarrhea and vomiting; no melena or hematochezia  Musculoskeletal:  Negative for arthralgias or myalgias  Genitourinary:  Negative for dysuria or polyuria  Hema/Lymph:  Negative for easy bleeding and easy bruising  Integumentary:  Negative for pruritus and rash  Neurological:  Negative for gait disturbance; negative for paresthesias   All other review of systems are negative.        PHYSICAL EXAM:   Blood pressure 130/68, pulse 57, height 5' 9\" (1.753 m), weight 237 lb (107.5 kg), SpO2 94%.  Constitutional: alert and oriented x3 in no acute distress  HEENT- EOMI, PERRL  Nose/Mouth/Throat: pharynx without erythema; no oral lesions  Neck/Thyroid: neck supple; no thyromegaly  Lymphatics: no lymphadenopathy of neck or groin  Cardiovascular: RRR, S1, S2, no S3 or murmur  Respiratory: lungs without crackles or wheezes  Abdomen: normoactive bowel sounds, soft, non-tender and non-distended  Extremities: no clubbing, cyanosis; 2+ BLE edema, and +edema to dorsum right hand  Vascular: no carotid bruits; DP/PT 2/2  Musculoskeletal: Motor 5/5 upper and lower extremities  Neurological: cranial nerves II-XII intact; light touch and proprioception intact  Skin: no rash or ulcerations         ASSESSMENT/PLAN:   CVA  On 11/7/23; experienced dizziness, difficulty speaking, inability to walk; has RUE, and RLE paresis; has AFO orthotic; admitted to [facility], then 11/10-23-12/8/23 at [facility]; MRI imaging reportedly showed a CVA; sxs progressed; SBP was >200 upon presentation  -on Plavix 75 mg po every day and ASA 81 mg po every day  -on baclofen 10 mg half-tab (=5 mg) po at bedtime  -seeing neurologist Dr Pranav Bartlett in Jan 2024, and cardiologist Dr Cassandra Godwin in Jan 2024    Right hand edema  -check venous doppler R/O DVT     Hypertension   -on carvedilol 6.25 mg po BID, lisinopril 10 mg po every day, nifedipine ER 30 mg  po BID and Lasix 20 mg po every day  -due to BLE edema, will decrease nifedipine ER to 30 mg po every day and increase lisinopril to 20 mg po every day; check BMP in 1 week     Scrotal edema  -decrease nifedipine as above  -miconazole powder 2% for scrotal rash    hypercholesterolemia  On atorvastatin 40 mg po at bedtime; check Lipoid panel     Diastolic dysfunction  ECHO in July 2020 shows EF >55% with mild diastolic dysfunction; cont Lasix 20 mg po qD      BLE edema  Suspect venous insufficiency; stable on Lasix 20 mg po qD; BMP stable in Feb 2022; UA neg protenuria    Osteoarthritis of left hip  -s/p left hip LORAINE on 6/7/23 by orthopedics Dr Roberto Bender at   -EKG in May 2023 shows NSR, LAD, minimal criteria for LVH     Cervical radiculopathy  Pain in trapezius and rhomboid areas; advise therapy course of massage, stretching and strengthening; he will pursue therapy with ; pain has progressed to involve left anterior upper chest as well as pain/numbness to left hand  -s/p Medrol dose swetha x 6d  -MRI cervical spine on 2/25/23 shows multilevel degenerative changes of the cervical spine, particularly at C7-T1, where there is mild-to-moderate spinal canal stenosis, ventral cord contour indentation, and severe bilateral foraminal stenosis.  Mild spinal canal narrowing at C4-C5 and severe right greater than left foraminal impingement.  -saw NS Dr Wolfe on 4/7/23--> no surgery recommended  -improved on Lyrica 75 mg po BID     Depression  C/o depressed mood; had been on Cymbalta x 9 months under care of Dr Becerra, though did not tolerate due flat affect and impotence;   -wife diagnosed with metastatic breast cancer (Onc Dr Santos)  -weaned off mirtazapine 30 mg po at bedtime  -on Wellbutrin  mg po BID     CONCEPCIÓN  +snoring; +unrefreshing sleep; +daytime hypersomnolence;   Severe by sleep study at [facility] sleep [place]; pt is compliant with CPAP and benefits from its use;  -on autoPAP 9-15 CWP; Rx per  Jenni Vargas, NP, [facility]     dyspnea  -check CT chest in June 2022 shows basilar predominant subpleural reticular and reticulonodular opacities throughout both lungs.  -s/p Advair 500/50 one puff BID and albuterol HFA 2 p QID prn  -s/p prednisone taper  -nuclear stress test negative for myocardial perfusion in Nov 2016  -repeat CT chest in Oct 2022 showed stable findings;   -PFTs normal in June 202  - F/U  Dr Fernando in March 2023     Dilated pulmonary artery  As seen on CT chest; last ECHO in July 2020  ECHO showed normal pulmonary artery pressure; no evidence of pulmonary HTN      Peripheral neuropathy  Affects both feet; B12 level 1090 in June 2020;  use of Nexium may be inhibiting oral absorption of B12     GERD (gastroesophageal reflux disease)  On esomeprazole 40 mg po qD; s/p laparoscopic fundoplication in July 2010 by Dr Matute at [place]; fundoplication may have since failed; s/p EGD in March 2016 by MICAH Clemente      PSA screening  Pt had elevated PSA of 5.7 in April 2015, and repeat PSA in Oct 2015 was 3.4; repeat PSA 2.54 in Nov 2016; repeat PSA 2.84 in June 2017, and most recent PSA 3.20 in Feb 2018; PSA 2.73 in May 2019; PSA 3.11 in June 2020; PSA 2.33 in Feb 2021; PSA 2.00 in Feb 2022     Prostatism  Nocturia x4; saw HARLEY Yeh; on alfuzosin 10 mg po qHS; stable     Family history of cancer (mother)  Pt had colonoscopy with GI Dr Giuseppe Clemente in March 2016;  -pt had colonoscopy in spring 2022 by GI Dr Santo with one polyp removed; next due in 5 years, or spring 2027     OA lumbar spine   XR L-spine with mild DJD; s/p physical therapy in 2017 under care of [facility]; reluctant to pursue facet injections; had MRI lumbar spine in 2018 under care of [facility]; suspect +LBP is most likely due to osteoarthritis; has been seeing physiatrist, Dr Fabio Becerra;s/p bilateral L2, L3, L4 and L5 medial branch block injection(s) under fluoroscopic guidance and contrast  enhancement in Nov 2019 and July 2020;  -off Cymbalta 60 mg po every day; had seen Dr Becerra   -tried methocarbamol 750 mg po q6hrs prn   -saw Ortho spine Dr Abhishek Woo--> no surgery advised  -on ibuprofen 400 mg po every day prn; takes sporadically  -pt tried acupuncture with modest benefit; F/U Dr Becerra     OA right hip  S/p right THR in March 2019 by orthopedics Dr Bender     Insomnia  Takes Advil PM prn; off Ambien      ED    Cialis 10 mg po qD prn     Diverticulosis  Colonoscopy in March 2016 by Dr Clemente     Obesity  Body mass index is 36.55 kg/m²..; did not take Vyvanse 20 mg po qD as appetite suppressant due to cost concerns     Travel prophlaxis  Went to [facility] in Jan 2020, [place] 2022     RTC 1 month for MWE  MWE 2/17/23      Spent 30 minutes obtaining history, evaluating patient, discussing treatment options, diet, exercise, review of available labs and radiology reports, and completing documentation.             Orders This Visit:  Orders Placed This Encounter   Procedures    Basic Metabolic Panel (8) [E]    Lipid Panel       Meds This Visit:  Requested Prescriptions     Signed Prescriptions Disp Refills    lisinopril 20 MG Oral Tab 90 tablet 3     Sig: Take 1 tablet (20 mg total) by mouth daily.    NIFEdipine ER 30 MG Oral Tablet 24 Hr 90 tablet 3     Sig: Take 1 tablet (30 mg total) by mouth daily.    miconazole 2 % External Powder 85 g 3     Sig: ATAA BID       Imaging & Referrals:  US VENOUS DOPPLER ARM RIGHT - DIAG IMG (CPT=93971)     1/17/2024  Colin Maldonado MD

## 2024-01-17 NOTE — TELEPHONE ENCOUNTER
Received request via fax for PA from [facility] for Miconazole. Placed in Purple folder.

## 2024-01-18 ENCOUNTER — PATIENT MESSAGE (OUTPATIENT)
Dept: INTERNAL MEDICINE CLINIC | Facility: CLINIC | Age: 79
End: 2024-01-18

## 2024-01-18 ENCOUNTER — HOSPITAL ENCOUNTER (OUTPATIENT)
Dept: ULTRASOUND IMAGING | Facility: HOSPITAL | Age: 79
Discharge: HOME | End: 2024-01-18
Attending: INTERNAL MEDICINE
Payer: MEDICARE

## 2024-01-18 DIAGNOSIS — R60.0 EDEMA OF RIGHT UPPER ARM: ICD-10-CM

## 2024-01-18 PROCEDURE — 93971 EXTREMITY STUDY: CPT | Performed by: INTERNAL MEDICINE

## 2024-01-19 NOTE — TELEPHONE ENCOUNTER
See other 1/19 mychart encounter  Patient sent another message stating he did not have blood in his urine

## 2024-01-19 NOTE — TELEPHONE ENCOUNTER
From: Bart Sheets  To: Colin Maldonado  Sent: 1/18/2024 8:58 AM CST  Subject: Question    I woke this morning and passed a little blood when urinating. Could this be a urinary tract infection?

## 2024-01-19 NOTE — TELEPHONE ENCOUNTER
Rocio sent back to patient    aGrry Vásquez,  Thanks for letting us know. Glad you don't have blood in your urine.  Please reach out to [facility] today about the scripts. Dr Maldonado did send all of these to [facility] [address] on 1/17/24. Please let us know if you are still having trouble. Thank you!  Take care,  Lisa PATEL

## 2024-01-19 NOTE — TELEPHONE ENCOUNTER
From: Bart Sheets  To: Colin Maldonado  Sent: 1/18/2024 8:16 PM CST  Subject: UTI    Dr. Schwartz:  False alarm on the blood in urine. Turned out it was a laceration on my thigh un-noticed. Also ,the [facility] [address] has not yet received the prescriptions you ordered. Finally, thank you for calling me regarding the results of the ultrasound. Very much appreciated!  Fahad

## 2024-02-21 ENCOUNTER — OFFICE VISIT (OUTPATIENT)
Dept: INTERNAL MEDICINE CLINIC | Facility: CLINIC | Age: 79
End: 2024-02-21

## 2024-02-21 VITALS
SYSTOLIC BLOOD PRESSURE: 116 MMHG | DIASTOLIC BLOOD PRESSURE: 52 MMHG | TEMPERATURE: 98 F | WEIGHT: 227 LBS | OXYGEN SATURATION: 92 % | BODY MASS INDEX: 33.62 KG/M2 | HEIGHT: 69 IN | HEART RATE: 71 BPM

## 2024-02-21 DIAGNOSIS — M47.816 SPONDYLOSIS OF LUMBAR REGION WITHOUT MYELOPATHY OR RADICULOPATHY: ICD-10-CM

## 2024-02-21 DIAGNOSIS — Z80.0 FAMILY HISTORY OF COLON CANCER: ICD-10-CM

## 2024-02-21 DIAGNOSIS — G47.00 INSOMNIA, UNSPECIFIED TYPE: ICD-10-CM

## 2024-02-21 DIAGNOSIS — I63.9 CEREBROVASCULAR ACCIDENT (CVA), UNSPECIFIED MECHANISM (HCC): ICD-10-CM

## 2024-02-21 DIAGNOSIS — M54.12 CERVICAL RADICULOPATHY: ICD-10-CM

## 2024-02-21 DIAGNOSIS — N50.89 SCROTAL EDEMA: ICD-10-CM

## 2024-02-21 DIAGNOSIS — M16.12 PRIMARY OSTEOARTHRITIS OF LEFT HIP: ICD-10-CM

## 2024-02-21 DIAGNOSIS — F32.A DEPRESSION, UNSPECIFIED DEPRESSION TYPE: ICD-10-CM

## 2024-02-21 DIAGNOSIS — R60.0 EDEMA OF RIGHT UPPER ARM: ICD-10-CM

## 2024-02-21 DIAGNOSIS — I51.89 DIASTOLIC DYSFUNCTION: ICD-10-CM

## 2024-02-21 DIAGNOSIS — N52.9 ERECTILE DYSFUNCTION, UNSPECIFIED ERECTILE DYSFUNCTION TYPE: ICD-10-CM

## 2024-02-21 DIAGNOSIS — E78.00 HYPERCHOLESTEREMIA: ICD-10-CM

## 2024-02-21 DIAGNOSIS — S40.022A TRAUMATIC ECCHYMOSIS OF LEFT UPPER ARM, INITIAL ENCOUNTER: ICD-10-CM

## 2024-02-21 DIAGNOSIS — R06.00 DYSPNEA, UNSPECIFIED TYPE: ICD-10-CM

## 2024-02-21 DIAGNOSIS — M16.11 PRIMARY OSTEOARTHRITIS OF RIGHT HIP: ICD-10-CM

## 2024-02-21 DIAGNOSIS — K57.90 DIVERTICULOSIS: ICD-10-CM

## 2024-02-21 DIAGNOSIS — I10 BENIGN HYPERTENSION: ICD-10-CM

## 2024-02-21 DIAGNOSIS — I28.8 DILATION OF PULMONARY ARTERY (HCC): ICD-10-CM

## 2024-02-21 DIAGNOSIS — R60.0 BILATERAL LOWER EXTREMITY EDEMA: ICD-10-CM

## 2024-02-21 DIAGNOSIS — G62.9 PERIPHERAL POLYNEUROPATHY: ICD-10-CM

## 2024-02-21 DIAGNOSIS — Z12.5 SCREENING PSA (PROSTATE SPECIFIC ANTIGEN): ICD-10-CM

## 2024-02-21 DIAGNOSIS — E66.9 OBESITY, CLASS II, BMI 35-39.9: ICD-10-CM

## 2024-02-21 DIAGNOSIS — N40.0 PROSTATISM: ICD-10-CM

## 2024-02-21 DIAGNOSIS — Z00.00 PHYSICAL EXAM, ANNUAL: Primary | ICD-10-CM

## 2024-02-21 DIAGNOSIS — M70.22 OLECRANON BURSITIS OF LEFT ELBOW: ICD-10-CM

## 2024-02-21 DIAGNOSIS — K21.9 GASTROESOPHAGEAL REFLUX DISEASE WITHOUT ESOPHAGITIS: ICD-10-CM

## 2024-02-21 DIAGNOSIS — G47.33 OSA (OBSTRUCTIVE SLEEP APNEA): ICD-10-CM

## 2024-02-21 PROCEDURE — 99214 OFFICE O/P EST MOD 30 MIN: CPT | Performed by: INTERNAL MEDICINE

## 2024-02-21 PROCEDURE — G0439 PPPS, SUBSEQ VISIT: HCPCS | Performed by: INTERNAL MEDICINE

## 2024-02-21 PROCEDURE — 96372 THER/PROPH/DIAG INJ SC/IM: CPT | Performed by: INTERNAL MEDICINE

## 2024-02-21 RX ORDER — ATORVASTATIN CALCIUM 20 MG/1
20 TABLET, FILM COATED ORAL NIGHTLY
Qty: 90 TABLET | Refills: 0 | Status: SHIPPED | OUTPATIENT
Start: 2024-02-21

## 2024-02-21 RX ORDER — GABAPENTIN 100 MG/1
100 CAPSULE ORAL 3 TIMES DAILY
Qty: 90 CAPSULE | Refills: 5 | Status: SHIPPED | OUTPATIENT
Start: 2024-02-21

## 2024-02-21 RX ORDER — CARVEDILOL 6.25 MG/1
6.25 TABLET ORAL 2 TIMES DAILY
Qty: 180 TABLET | Refills: 3 | Status: SHIPPED | OUTPATIENT
Start: 2024-02-21

## 2024-02-21 RX ORDER — TRIAMCINOLONE ACETONIDE 40 MG/ML
40 INJECTION, SUSPENSION INTRA-ARTICULAR; INTRAMUSCULAR ONCE
Status: COMPLETED | OUTPATIENT
Start: 2024-02-21 | End: 2024-02-21

## 2024-02-21 RX ADMIN — TRIAMCINOLONE ACETONIDE 40 MG: 40 INJECTION, SUSPENSION INTRA-ARTICULAR; INTRAMUSCULAR at 17:44:00

## 2024-02-21 NOTE — PROGRESS NOTES
Bart Sheets is a 78 year old male.    HPI:     Chief Complaint   Patient presents with    Physical     MA    Pain     C/o pain x 1 month to both shoulders and Lt elbow. Hx fall 1 week ago. Did not seek tx after fall   chief complaint: presents for both AWV and follow up for chronic conditions and/or medication refills,      77 y/o M here for subsequent Medicare annual wellness exam; pt still undergoing physical therapy after recent CVA; right hand strength is improved, though remains weak; he suffered fall onto left upper extremity with ecchymoses to LUE and trauma to left olecranon area;  no CP; no SOB; no headaches; no palpitation; still has swelling to legs and scrotal areas; c/o paresthesias to feet; LDL 12 on atorvastatin 40 mg po qHS          HISTORY:  Past Medical History:   Diagnosis Date    ACL tear     left ACL tear s/p reconstruction: Ortho Dr. 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      Past Surgical History:   Procedure Laterality Date    ELECTROCARDIOGRAM, COMPLETE  2012    scanned to media tab    HIP REPLACEMENT SURGERY Right 2018    OTHER SURGICAL HISTORY  3/27/2012    ACL repair    OTHER SURGICAL HISTORY      s/p laparoscopic nissen fundoplication      Family History   Problem Relation Age of Onset    Diabetes Father         cause of death    Cancer Mother         colon      Social History:   Social History     Socioeconomic History    Marital status:    Tobacco Use    Smoking status: Former     Years: 30     Types: Cigarettes     Quit date: 2000     Years since quittin.1    Smokeless tobacco: Never   Vaping Use    Vaping  Use: Never used   Substance and Sexual Activity    Alcohol use: Yes     Comment: wine, 1 glass daily    Drug use: No   Other Topics Concern    Caffeine Concern Yes     Comment: coffee/soda, 2 cups    Exercise Yes   Social History Narrative    The patient does not use an assistive device..      The patient does live in a home with stairs.        Medications (Active prior to today's visit):  Current Outpatient Medications   Medication Sig Dispense Refill    carvedilol 6.25 MG Oral Tab Take 1 tablet (6.25 mg total) by mouth 2 (two) times daily. 180 tablet 3    gabapentin 100 MG Oral Cap Take 1 capsule (100 mg total) by mouth 3 (three) times daily. 90 capsule 5    atorvastatin 20 MG Oral Tab Take 1 tablet (20 mg total) by mouth nightly. 90 tablet 0    acetaminophen 325 MG Oral Tab Take 1 tablet (325 mg total) by mouth every 6 (six) hours as needed for Pain.      lisinopril 20 MG Oral Tab Take 1 tablet (20 mg total) by mouth daily. 90 tablet 3    aspirin 81 MG Oral Tab EC Take 1 tablet (81 mg total) by mouth daily.      clopidogrel 75 MG Oral Tab Take 1 tablet (75 mg total) by mouth daily.      Esomeprazole Magnesium 40 MG Oral Capsule Delayed Release Take 1 capsule (40 mg total) by mouth before breakfast. 90 capsule 3    ALFUZOSIN ER 10 MG Oral Tablet 24 Hr TAKE 1 TABLET EVERY DAY 90 tablet 3    miconazole 2 % External Powder ATAA BID (Patient not taking: Reported on 2/21/2024) 85 g 3       Allergies:  No Known Allergies            ROS:   Constitutional: no weight loss; no fatigue  ENMT:  Negative for ear drainage, hearing loss and nasal drainage  Eyes:  Negative for eye discharge and vision loss  Cardiovascular:  Negative for chest pain; negative palpitations  Respiratory:  Negative for cough, dyspnea and wheezing  Endocrine:  Negative for abnormal sleep patterns, increased activity, polydipsia and polyphagia  Gastrointestinal:  Negative for abdominal pain, constipation, decreased appetite, diarrhea and vomiting; no  melena or hematochezia  Musculoskeletal:  Negative for arthralgias or myalgias  Genitourinary:  Negative for dysuria or polyuria  Hema/Lymph:  Negative for easy bleeding and easy bruising  Integumentary:  Negative for pruritus and rash  Neurological:  Negative for gait disturbance; negative for paresthesias   All other review of systems are negative.        PHYSICAL EXAM:   Blood pressure 116/52, pulse 71, temperature 98.2 °F (36.8 °C), temperature source Oral, height 5' 9\" (1.753 m), weight 227 lb (103 kg), SpO2 92%.  Constitutional: alert and oriented x3 in no acute distress  HEENT- EOMI, PERRL  Nose/Mouth/Throat: pharynx without erythema; no oral lesions  Neck/Thyroid: neck supple; no thyromegaly  Lymphatics: no lymphadenopathy of neck or groin  Cardiovascular: RRR, S1, S2, no S3 or murmur  Respiratory: lungs without crackles or wheezes  Abdomen: normoactive bowel sounds, soft, non-tender and non-distended  Extremities: no clubbing, cyanosis or edema  Vascular: no carotid bruits; DP/PT 2/2  Musculoskeletal: Motor 5/5 upper and lower extremities; +left olecranon swelling; +ecchymosis to LUE  Neurological: cranial nerves II-XII intact; light touch and proprioception intact  Skin: no rash or ulcerations         ASSESSMENT/PLAN:   Physical exam    Ecchymoses LUE s/p fall  Olecranon bursitis of left elbow  S/p aspiration  Procedure- after informed consent; area prepped with Betadine; with 20G 1.5 inch needle, 13 ml sanguinous fluid aspirated; Kenalog 40 mg instilled; bandage applied    CVA  On 11/7/23; experienced dizziness, difficulty speaking, inability to walk; has RUE, and RLE paresis; has AFO orthotic; admitted to [facility], then 11/10-23-12/8/23 at [facility]; MRI imaging reportedly showed a CVA; sxs progressed; SBP was >200 upon presentation  -on Plavix 75 mg po every day and ASA 81 mg po every day  -off baclofen 10 mg half-tab (=5 mg) po at bedtime; muscle spasms quiescent   -saw neurologist Dr Rock  Dhruv in Jan 2024, and cardiologist Dr Cassandra Godwin in Jan 2024     Right hand edema  -RUE venous doppler neg DVT in jan 2024      Hypertension   -on carvedilol 6.25 mg po BID, lisinopril 20 mg po every day, nifedipine ER 30 mg po qD and Lasix 20 mg po every day  -due to BLE edema, will stop nifedipine ER 30 mg po every     Scrotal edema  -decrease nifedipine as above  -miconazole powder 2% for scrotal rash     hypercholesterolemia  LDL 12 on Feb 2024 on atorvastatin 40 mg po at bedtime;  -decrease atorvastatin to 20 mg po qHS     Diastolic dysfunction  ECHO in July 2020 shows EF >55% with mild diastolic dysfunction; cont Lasix 20 mg po qD      BLE edema  Suspect venous insufficiency; stable on Lasix 20 mg po qD; BMP stable in Feb 2022; UA neg protenuria     Osteoarthritis of left hip  -s/p left hip LORAINE on 6/7/23 by orthopedics Dr Roberto Bender at   -EKG in May 2023 shows NSR, LAD, minimal criteria for LVH     Cervical radiculopathy  Pain in trapezius and rhomboid areas; advise therapy course of massage, stretching and strengthening; he will pursue therapy with ; pain has progressed to involve left anterior upper chest as well as pain/numbness to left hand  -s/p Medrol dose swetha x 6d  -MRI cervical spine on 2/25/23 shows multilevel degenerative changes of the cervical spine, particularly at C7-T1, where there is mild-to-moderate spinal canal stenosis, ventral cord contour indentation, and severe bilateral foraminal stenosis.  Mild spinal canal narrowing at C4-C5 and severe right greater than left foraminal impingement.  -saw NS Dr Wolfe on 4/7/23--> no surgery recommended  -improved on Lyrica 75 mg po BID     Depression  C/o depressed mood; had been on Cymbalta x 9 months under care of Dr Becerra, though did not tolerate due flat affect and impotence;   -wife diagnosed with metastatic breast cancer (Onc Dr Santos)  -weaned off mirtazapine 30 mg po at bedtime  -on Wellbutrin  mg po BID      CONCEPCIÓN  +snoring; +unrefreshing sleep; +daytime hypersomnolence;   Severe by sleep study at [facility]; pt is compliant with CPAP and benefits from its use;  -on autoPAP 9-15 CWP; Rx per Jenni Vargas NP, [facility]     dyspnea  -check CT chest in June 2022 shows basilar predominant subpleural reticular and reticulonodular opacities throughout both lungs.  -s/p Advair 500/50 one puff BID and albuterol HFA 2 p QID prn  -s/p prednisone taper  -nuclear stress test negative for myocardial perfusion in Nov 2016  -repeat CT chest in Oct 2022 showed stable findings;   -PFTs normal in June 202  - F/U  Dr Fernando in March 2023     Dilated pulmonary artery  As seen on CT chest; last ECHO in July 2020  ECHO showed normal pulmonary artery pressure; no evidence of pulmonary HTN      Peripheral neuropathy  Affects both feet; B12 level 1090 in June 2020;  use of Nexium may be inhibiting oral absorption of B12  -start gabapentin 100 mg po TID     GERD (gastroesophageal reflux disease)  On esomeprazole 40 mg po qD; s/p laparoscopic fundoplication in July 2010 by Dr Matute at [place]; fundoplication may have since failed; s/p EGD in March 2016 by MICAH Clemente      PSA screening  Pt had elevated PSA of 5.7 in April 2015, and repeat PSA in Oct 2015 was 3.4; repeat PSA 2.54 in Nov 2016; repeat PSA 2.84 in June 2017, and most recent PSA 3.20 in Feb 2018; PSA 2.73 in May 2019; PSA 3.11 in June 2020; PSA 2.33 in Feb 2021; PSA 2.00 in Feb 2022     Prostatism  Nocturia x4; saw HARLEY Yeh; on alfuzosin 10 mg po qHS; stable     Family history of cancer (mother)  Pt had colonoscopy with GI Dr Giuseppe Clemente in March 2016;  -pt had colonoscopy in spring 2022 by GI Dr Santo with one polyp removed; next due in 5 years, or spring 2027     OA lumbar spine   XR L-spine with mild DJD; s/p physical therapy in 2017 under care of [facility]; reluctant to pursue facet injections; had MRI lumbar spine in 2018 under  care of [facility]; suspect +LBP is most likely due to osteoarthritis; has been seeing physiatrist, Dr Fabio Becerra;s/p bilateral L2, L3, L4 and L5 medial branch block injection(s) under fluoroscopic guidance and contrast enhancement in Nov 2019 and July 2020;  -off Cymbalta 60 mg po every day; had seen Dr Becerra   -tried methocarbamol 750 mg po q6hrs prn   -saw Ortho spine Dr Abhishek Woo--> no surgery advised  -on ibuprofen 400 mg po every day prn; takes sporadically  -pt tried acupuncture with modest benefit; F/U Dr Becerra     OA right hip  S/p right THR in March 2019 by orthopedics Dr Bender     Insomnia  Takes Advil PM prn; off Ambien      ED    Cialis 10 mg po qD prn     Diverticulosis  Colonoscopy in March 2016 by Dr Clemente     Obesity  Body mass index is 36.55 kg/m²..; did not take Vyvanse 20 mg po qD as appetite suppressant due to cost concerns     Travel prophlaxis  Went to [facility] in Jan 2020, [place] 2022       MWE 2/21/24      Spent 30 minutes obtaining history, evaluating patient, discussing treatment options, diet, exercise, review of available labs and radiology reports, and completing documentation.                Orders This Visit:  No orders of the defined types were placed in this encounter.      Meds This Visit:  Requested Prescriptions     Signed Prescriptions Disp Refills    carvedilol 6.25 MG Oral Tab 180 tablet 3     Sig: Take 1 tablet (6.25 mg total) by mouth 2 (two) times daily.    gabapentin 100 MG Oral Cap 90 capsule 5     Sig: Take 1 capsule (100 mg total) by mouth 3 (three) times daily.    atorvastatin 20 MG Oral Tab 90 tablet 0     Sig: Take 1 tablet (20 mg total) by mouth nightly.       Imaging & Referrals:  None     2/21/2024  Colin Maldonado MD

## 2024-03-01 ENCOUNTER — PATIENT MESSAGE (OUTPATIENT)
Dept: INTERNAL MEDICINE CLINIC | Facility: CLINIC | Age: 79
End: 2024-03-01

## 2024-03-01 RX ORDER — ESOMEPRAZOLE MAGNESIUM 40 MG/1
40 CAPSULE, DELAYED RELEASE ORAL
Qty: 90 CAPSULE | Refills: 3 | Status: SHIPPED | OUTPATIENT
Start: 2024-03-01

## 2024-03-01 RX ORDER — ATORVASTATIN CALCIUM 20 MG/1
20 TABLET, FILM COATED ORAL NIGHTLY
Qty: 90 TABLET | Refills: 3 | Status: SHIPPED | OUTPATIENT
Start: 2024-03-01

## 2024-03-01 RX ORDER — ALFUZOSIN HYDROCHLORIDE 10 MG/1
10 TABLET, EXTENDED RELEASE ORAL DAILY
Qty: 90 TABLET | Refills: 3 | Status: SHIPPED | OUTPATIENT
Start: 2024-03-01

## 2024-03-01 NOTE — TELEPHONE ENCOUNTER
From: Bart Sheets  To: Colin Maldonado  Sent: 3/1/2024 11:32 AM CST  Subject: Medications    Just to inform you that my mail order medications have transferred to [facility] from [facility].  Thank You

## 2024-03-01 NOTE — TELEPHONE ENCOUNTER
[facility] requesting    Bupropion HCL SR tabs 150 mg  Furosemide tabs 20 mg  Esomeprazole Magnesium DR caps 40 mg  Alfuzosin HCL ER tabs 10 mg  Atorvastatin tabs 20 mg

## 2024-03-06 ENCOUNTER — TELEPHONE (OUTPATIENT)
Dept: INTERNAL MEDICINE CLINIC | Facility: CLINIC | Age: 79
End: 2024-03-06

## 2024-03-06 ENCOUNTER — PATIENT MESSAGE (OUTPATIENT)
Dept: INTERNAL MEDICINE CLINIC | Facility: CLINIC | Age: 79
End: 2024-03-06

## 2024-03-06 RX ORDER — ESCITALOPRAM OXALATE 10 MG/1
10 TABLET ORAL DAILY
Qty: 30 TABLET | Refills: 5 | Status: SHIPPED | OUTPATIENT
Start: 2024-03-06

## 2024-03-06 NOTE — TELEPHONE ENCOUNTER
----- Message from Bart Sheets sent at 3/6/2024  3:03 PM CST -----  Regarding: Medication?  Contact: [phone]  Dear dr. Schwartz,  I’m hoping that you can help me. I’m doing everything I can to insure my recovery , but I am severely depressed and I can’t seem to stop crying. I don’t think this is helping my recovery and I would hope you can prescribe something that would help elevate my mood. I am taking Wellbutrin twice a day but it doesn’t seem to help as it did before. Any recommendations would  be most appreciated.  Thank you…Fhaad Sheets

## 2024-03-06 NOTE — TELEPHONE ENCOUNTER
From: Bart Sheets  To: Colin Maldonado  Sent: 3/6/2024 3:03 PM CST  Subject: Medication?    Dear dr. Schwartz,  I’m hoping that you can help me. I’m doing everything I can to insure my recovery , but I am severely depressed and I can’t seem to stop crying. I don’t think this is helping my recovery and I would hope you can prescribe something that would help elevate my mood. I am taking Wellbutrin twice a day but it doesn’t seem to help as it did before. Any recommendations would be most appreciated.  Thank you…Fahad Sheets

## 2024-03-07 NOTE — TELEPHONE ENCOUNTER
Depression  C/o depressed mood; had been on Cymbalta x 9 months in 2021 under care of Dr Becerra, though did not tolerate due flat affect and impotence;   -wife diagnosed with metastatic breast cancer (Onc Dr Santos)  -weaned off mirtazapine 30 mg po at bedtime in Dec 2023  -on Wellbutrin  mg po BID    Rec- add Lexapro 10 mg po every day     ERx sent; pt called

## 2024-03-12 RX ORDER — LISINOPRIL 20 MG/1
20 TABLET ORAL DAILY
Qty: 90 TABLET | Refills: 3 | OUTPATIENT
Start: 2024-03-12

## 2024-03-12 NOTE — TELEPHONE ENCOUNTER
Requested Prescriptions     Refused Prescriptions Disp Refills    lisinopril 20 MG Oral Tab 90 tablet 3     Sig: Take 1 tablet (20 mg total) by mouth daily.     Refused By: RODY DICK     Reason for Refusal: Duplicate refill request

## 2024-03-21 ENCOUNTER — PATIENT MESSAGE (OUTPATIENT)
Dept: INTERNAL MEDICINE CLINIC | Facility: CLINIC | Age: 79
End: 2024-03-21

## 2024-03-22 RX ORDER — BUPROPION HYDROCHLORIDE 150 MG/1
150 TABLET ORAL 2 TIMES DAILY
Qty: 180 TABLET | Refills: 3 | Status: SHIPPED | OUTPATIENT
Start: 2024-03-22

## 2024-03-22 NOTE — TELEPHONE ENCOUNTER
From: Bart Sheets  Sent: 3/22/2024 9:31 AM CDT  To: [facility] Clinical Staff  Subject: Medication refill Bupropion    Yes, I am still taking the Wellbutrin . That must be the refill requested.

## 2024-03-22 NOTE — TELEPHONE ENCOUNTER
Refill request is for a maintenance medication and has met the criteria specified in the Ambulatory Medication Refill Standing Order for eligibility, visits, laboratory, alerts and was sent to the requested pharmacy.    Requested Prescriptions     Signed Prescriptions Disp Refills    buPROPion  MG Oral Tablet 24 Hr 180 tablet 3     Sig: Take 1 tablet (150 mg total) by mouth in the morning and 1 tablet (150 mg total) before bedtime.     Authorizing Provider: CHANTELL KING     Ordering User: TRANG REN

## 2024-04-05 ENCOUNTER — PATIENT MESSAGE (OUTPATIENT)
Dept: INTERNAL MEDICINE CLINIC | Facility: CLINIC | Age: 79
End: 2024-04-05

## 2024-04-05 ENCOUNTER — TELEPHONE (OUTPATIENT)
Dept: INTERNAL MEDICINE CLINIC | Facility: CLINIC | Age: 79
End: 2024-04-05

## 2024-04-05 NOTE — TELEPHONE ENCOUNTER
Called patient and explained that he needs to call his cardiologist who prescribed Plavix to get  a refill -verbalized understanding

## 2024-04-05 NOTE — TELEPHONE ENCOUNTER
----- Message from Bart Sheets sent at 4/5/2024 11:03 AM CDT -----  Regarding: Refill  Contact: [phone]  Dr. Schwartz,  Would it be possible for you do add PLAVIX to my medication list? The cardiologist at [facility] prescribed it but having it filled at [facility] is more expensive than if it’s sent to [facility] so I would get via mail. The dosage is 75mg once a day. Thank you for your consideration .  Fahad Sheets

## 2024-04-05 NOTE — TELEPHONE ENCOUNTER
From: Bart Sheets  To: Colin Maldonado  Sent: 4/5/2024 11:03 AM CDT  Subject: Refill    Dr. Schwartz,  Would it be possible for you do add PLAVIX to my medication list? The cardiologist at [facility] prescribed it but having it filled at [facility] is more expensive than if it’s sent to [facility] so I would get via mail. The dosage is 75mg once a day. Thank you for your consideration .  Fahad Sheets

## 2024-04-10 ENCOUNTER — TELEPHONE (OUTPATIENT)
Dept: INTERNAL MEDICINE CLINIC | Facility: CLINIC | Age: 79
End: 2024-04-10

## 2024-04-10 NOTE — TELEPHONE ENCOUNTER
Called pt and LVM asking pt to CB with more details regarding  PT order he is requesting. Body part he is requesting?   who last ordered the PT? No PT referral found from Dr DUMONT?

## 2024-04-10 NOTE — TELEPHONE ENCOUNTER
----- Message from Bart Sheets sent at 4/10/2024  3:27 PM CDT -----  Regarding: Physical therapy  Contact: [phone]  Would you please send me an order for physical therapy so that I can continue my sessions at [facility]? It  would be most appreciated. Thank you

## 2024-04-10 NOTE — TELEPHONE ENCOUNTER
Patient returned missed call he believes is about a my chart message he sent today   Please call again     See message below   Would you please send me an order for physical therapy so that I can continue my sessions at [facility]? It would be most appreciated. Thank you   
Replied to prior TE from 4-10  
0 = swallows foods/liquids without difficulty

## 2024-04-10 NOTE — TELEPHONE ENCOUNTER
Called and spoke with pt who stated that his Neurologist has previously ordered his PT. Pt advised to call his Neurologist for the new referral. He will do so today.

## 2024-04-13 ENCOUNTER — PATIENT MESSAGE (OUTPATIENT)
Dept: INTERNAL MEDICINE CLINIC | Facility: CLINIC | Age: 79
End: 2024-04-13

## 2024-04-15 ENCOUNTER — TELEPHONE (OUTPATIENT)
Dept: INTERNAL MEDICINE CLINIC | Facility: CLINIC | Age: 79
End: 2024-04-15

## 2024-04-15 RX ORDER — TADALAFIL 20 MG/1
20 TABLET ORAL
Qty: 30 TABLET | Refills: 3 | Status: SHIPPED | OUTPATIENT
Start: 2024-04-15

## 2024-04-15 NOTE — TELEPHONE ENCOUNTER
Last OV: 2/21/24    Last Rx: 2/17/23  #24/3  Tadalafil 10mg daily as needed.    Patient has never had 20mg prescribed.     To Dr. Maldonado--

## 2024-04-15 NOTE — TELEPHONE ENCOUNTER
Niki Huber sent to [facility] for tadalafil 20 mg po daily as needed, #30, 3 refills    Dr Maldonado

## 2024-04-15 NOTE — TELEPHONE ENCOUNTER
From: Bart Sheets  To: Colin Maldonado  Sent: 4/13/2024 12:19 PM CDT  Subject: Refil    Dr. Schwartz,  Would it be possible to add to my prescriptions TADALAFIL 20mg? You have prescribed this for me in the past.  Thank you and hope you have a great weekend.  Fahad sheets

## 2024-04-15 NOTE — TELEPHONE ENCOUNTER
----- Message from Bart Sheets sent at 4/13/2024 12:19 PM CDT -----  Regarding: Refil  Contact: [phone]  Dr. Schwartz,  Would it be possible to add to my prescriptions TADALAFIL  20mg? You have prescribed this for me in the past.  Thank you and hope you have a great weekend.  Fahad sheets

## 2024-04-19 ENCOUNTER — PATIENT MESSAGE (OUTPATIENT)
Dept: INTERNAL MEDICINE CLINIC | Facility: CLINIC | Age: 79
End: 2024-04-19

## 2024-04-19 RX ORDER — TADALAFIL 20 MG/1
20 TABLET ORAL
Qty: 30 TABLET | Refills: 3 | Status: SHIPPED | OUTPATIENT
Start: 2024-04-19

## 2024-04-19 NOTE — TELEPHONE ENCOUNTER
From: Bart Sheets  To: Colin Maldonado  Sent: 4/19/2024 9:21 AM CDT  Subject: Refill    Dr. Schwartz,     [facility] won’t refill my prescription because my insurance plan does not cover TADALAFIL 20mg. Can you kindly send it to [facility] on [address]. Sorry for the problem. See you in a few weeks.  Fahad Sheets

## 2024-04-22 ENCOUNTER — PATIENT MESSAGE (OUTPATIENT)
Dept: INTERNAL MEDICINE CLINIC | Facility: CLINIC | Age: 79
End: 2024-04-22

## 2024-04-22 RX ORDER — LISINOPRIL 20 MG/1
20 TABLET ORAL DAILY
Qty: 90 TABLET | Refills: 3 | Status: SHIPPED | OUTPATIENT
Start: 2024-04-22

## 2024-04-22 RX ORDER — LISINOPRIL 20 MG/1
20 TABLET ORAL DAILY
Qty: 90 TABLET | Refills: 3 | Status: CANCELLED | OUTPATIENT
Start: 2024-04-22

## 2024-04-22 NOTE — TELEPHONE ENCOUNTER
From: Bart Sheets  To: Colin Maldonado  Sent: 4/22/2024 3:06 PM CDT  Subject: [facility]    [facility] is awaiting Dr. Schwartz’s approval before they can fill this prescription: LISINOPRIL 20mg. I would most appreciate your help in this matter. Thank You…. Fahad Sheets

## 2024-04-22 NOTE — TELEPHONE ENCOUNTER
Refill request is for a maintenance medication and has met the criteria specified in the Ambulatory Medication Refill Standing Order for eligibility, visits, laboratory, alerts and was sent to the requested pharmacy.    Requested Prescriptions     Signed Prescriptions Disp Refills    lisinopril 20 MG Oral Tab 90 tablet 3     Sig: Take 1 tablet (20 mg total) by mouth daily.     Authorizing Provider: CHANTELL KING     Ordering User: ALEENA JIMENEZ

## 2024-05-17 ENCOUNTER — OFFICE VISIT (OUTPATIENT)
Dept: INTERNAL MEDICINE CLINIC | Facility: CLINIC | Age: 79
End: 2024-05-17

## 2024-05-17 VITALS
BODY MASS INDEX: 34.07 KG/M2 | WEIGHT: 230 LBS | TEMPERATURE: 98 F | HEART RATE: 56 BPM | OXYGEN SATURATION: 95 % | SYSTOLIC BLOOD PRESSURE: 118 MMHG | HEIGHT: 69 IN | DIASTOLIC BLOOD PRESSURE: 62 MMHG

## 2024-05-17 DIAGNOSIS — E66.9 OBESITY (BMI 30.0-34.9): ICD-10-CM

## 2024-05-17 DIAGNOSIS — I63.9 CEREBROVASCULAR ACCIDENT (CVA), UNSPECIFIED MECHANISM (HCC): ICD-10-CM

## 2024-05-17 DIAGNOSIS — F32.A DEPRESSION, UNSPECIFIED DEPRESSION TYPE: Primary | ICD-10-CM

## 2024-05-17 DIAGNOSIS — I10 BENIGN HYPERTENSION: ICD-10-CM

## 2024-05-17 PROCEDURE — G2211 COMPLEX E/M VISIT ADD ON: HCPCS | Performed by: INTERNAL MEDICINE

## 2024-05-17 PROCEDURE — 99214 OFFICE O/P EST MOD 30 MIN: CPT | Performed by: INTERNAL MEDICINE

## 2024-05-17 RX ORDER — HYDRALAZINE HYDROCHLORIDE 25 MG/1
25 TABLET, FILM COATED ORAL 3 TIMES DAILY PRN
Qty: 42 TABLET | Refills: 3 | Status: SHIPPED | OUTPATIENT
Start: 2024-05-17

## 2024-05-17 NOTE — PROGRESS NOTES
Bart Sheets is a 78 year old male.    HPI:     Chief Complaint   Patient presents with    Checkup     Follow up - questions regarding wegovy rx        77 y/o M with CVA here for F/U; -finished OT; awaits PT at [facility] in [place]; has improved hand  mobility though gait imbalance persists; plans to resume Wegovy;  no CP; no SOB; no headaches; no palpitation        HISTORY:  Past Medical History:    ACL tear    left ACL tear s/p reconstruction: Ortho Dr. Holly - repair 3/27/12    Aortic sclerosis    mild AI    Depression    ED (erectile dysfunction)    Esophageal reflux    Hiatal hernia    History of ankle fracture    s/p casting     Lipid screening    per  health maintenance PPD    Osteoarthritis of right hip    s/p right THR in 2019; orthopedics Dr Bender    Stroke (cerebrum) (HCC)      Past Surgical History:   Procedure Laterality Date    Electrocardiogram, complete  2012    scanned to media tab    Hip replacement surgery Right 2018    Other surgical history  3/27/2012    ACL repair    Other surgical history      s/p laparoscopic nissen fundoplication      Family History   Problem Relation Age of Onset    Diabetes Father         cause of death    Cancer Mother         colon      Social History:   Social History     Socioeconomic History    Marital status:    Tobacco Use    Smoking status: Former     Current packs/day: 0.00     Types: Cigarettes     Start date: 1970     Quit date: 2000     Years since quittin.3    Smokeless tobacco: Never   Vaping Use    Vaping status: Never Used   Substance and Sexual Activity    Alcohol use: Yes     Comment: wine, 1 glass daily    Drug use: No   Other Topics Concern    Caffeine Concern Yes     Comment: coffee/soda, 2 cups    Exercise Yes   Social History Narrative    The patient does not use an assistive device..      The patient does live in a home with stairs.        Medications (Active prior to today's visit):  Current  Outpatient Medications   Medication Sig Dispense Refill    semaglutide-weight management 0.25 MG/0.5ML Subcutaneous Solution Auto-injector Inject 0.5 mL (0.25 mg total) into the skin once a week for 4 doses. 2 mL 0    hydrALAZINE 25 MG Oral Tab Take 1 tablet (25 mg total) by mouth 3 (three) times daily as needed (SBP > 160). 42 tablet 3    lisinopril 20 MG Oral Tab Take 1 tablet (20 mg total) by mouth daily. 90 tablet 3    Tadalafil 20 MG Oral Tab Take 1 tablet (20 mg total) by mouth daily as needed for Erectile Dysfunction. 30 tablet 3    buPROPion  MG Oral Tablet 24 Hr Take 1 tablet (150 mg total) by mouth in the morning and 1 tablet (150 mg total) before bedtime. 180 tablet 3    escitalopram 10 MG Oral Tab Take 1 tablet (10 mg total) by mouth daily. 30 tablet 5    alfuzosin ER 10 MG Oral Tablet 24 Hr Take 1 tablet (10 mg total) by mouth daily. 90 tablet 3    Esomeprazole Magnesium 40 MG Oral Capsule Delayed Release Take 1 capsule (40 mg total) by mouth before breakfast. 90 capsule 3    atorvastatin 20 MG Oral Tab Take 1 tablet (20 mg total) by mouth nightly. 90 tablet 3    carvedilol 6.25 MG Oral Tab Take 1 tablet (6.25 mg total) by mouth 2 (two) times daily. 180 tablet 3    aspirin 81 MG Oral Tab EC Take 1 tablet (81 mg total) by mouth daily.      clopidogrel 75 MG Oral Tab Take 1 tablet (75 mg total) by mouth daily.      gabapentin 100 MG Oral Cap Take 1 capsule (100 mg total) by mouth 3 (three) times daily. 90 capsule 5    acetaminophen 325 MG Oral Tab Take 1 tablet (325 mg total) by mouth every 6 (six) hours as needed for Pain.      miconazole 2 % External Powder ATAA BID (Patient not taking: Reported on 2/21/2024) 85 g 3       Allergies:  No Known Allergies              ROS:   Constitutional: no weight loss; no fatigue  Cardiovascular:  Negative for chest pain; negative palpitations  Respiratory:  Negative for cough, dyspnea and wheezing  Gastrointestinal:  Negative for abdominal pain, constipation,  decreased appetite, diarrhea and vomiting; no melena or hematochezia  All other review of systems are negative.        PHYSICAL EXAM:   Blood pressure 118/62, pulse 56, temperature 98.1 °F (36.7 °C), height 5' 9\" (1.753 m), weight 230 lb (104.3 kg), SpO2 95%.  Constitutional: alert and oriented x3 in no acute distress  HEENT- EOMI, PERRL  Nose/Mouth/Throat: pharynx without erythema; no oral lesions  Neck/Thyroid: neck supple; no thyromegaly  Cardiovascular: RRR, S1, S2, no S3 or murmur  Respiratory: lungs without crackles or wheezes  Abdomen: normoactive bowel sounds, soft, non-tender and non-distended  Extremities: no clubbing, cyanosis or edema           ASSESSMENT/PLAN:   Depression  C/o depressed mood; had been on Cymbalta x 9 months in 2021 under care of Dr Becerra, though did not tolerate due flat affect and impotence;   -wife diagnosed with metastatic breast cancer (Onc Dr Santos)  -weaned off mirtazapine 30 mg po at bedtime in Dec 2023  -on Wellbutrin  mg po BID  -started Lexapro 10 mg po every day in March 2024; just started taking regularly recently    Obesity  Body mass index is 33.97 kg/m².; Wegovy may decrease risk of CV complications in addition assisting in weight loss  -start Wegovy  Week 1 through week 4 : 0.25 mg once weekly.  Week 5 through week 8: 0.5 mg once weekly.  Week 9 through week 12: 1 mg once weekly.  Week 13 through week 16: 1.7 mg once weekly.    Ecchymoses LUE s/p fall  Olecranon bursitis of left elbow  S/p aspiration  Procedure- after informed consent; area prepped with Betadine; with 20G 1.5 inch needle, 13 ml sanguinous fluid aspirated; Kenalog 40 mg instilled; bandage applied     CVA  On 11/7/23; experienced dizziness, difficulty speaking, inability to walk; has RUE, and RLE paresis; has AFO orthotic; admitted to [facility], then 11/10-23-12/8/23 at [facility]; MRI imaging reportedly showed a CVA; sxs progressed; SBP was >200 upon presentation  -on Plavix 75 mg po every  day and ASA 81 mg po every day  -off baclofen 10 mg half-tab (=5 mg) po at bedtime; muscle spasms quiescent   -saw neurologist Dr Pranav Bartlett in Jan 2024, and cardiologist Dr Cassandra Godwin in Jan 2024  -finished OT; awaits PT at [facility] in [place]; has improved hand  mobility though gait imbalance persists     Right hand edema  -RUE venous doppler neg DVT in jan 2024      Hypertension   -on carvedilol 6.25 mg po BID, lisinopril 20 mg po every day, nifedipine ER 30 mg po qD and Lasix 20 mg po every day  -due to BLE edema, will stop nifedipine ER 30 mg po every  -Rx given for hydralazine 25 mg po TID prn SBP >160     Scrotal edema  -decrease nifedipine as above  -miconazole powder 2% for scrotal rash     hypercholesterolemia  LDL 12 on Feb 2024 on atorvastatin 40 mg po at bedtime;  -decreased atorvastatin to 20 mg po qHS     Diastolic dysfunction  ECHO in July 2020 shows EF >55% with mild diastolic dysfunction; cont Lasix 20 mg po qD      BLE edema  Suspect venous insufficiency; stable on Lasix 20 mg po qD; BMP stable in Feb 2022; UA neg protenuria     Osteoarthritis of left hip  -s/p left hip LORAINE on 6/7/23 by orthopedics Dr Roberto Bender at   -EKG in May 2023 shows NSR, LAD, minimal criteria for LVH     Cervical radiculopathy  Pain in trapezius and rhomboid areas; advise therapy course of massage, stretching and strengthening; he will pursue therapy with ; pain has progressed to involve left anterior upper chest as well as pain/numbness to left hand  -s/p Medrol dose swetha x 6d  -MRI cervical spine on 2/25/23 shows multilevel degenerative changes of the cervical spine, particularly at C7-T1, where there is mild-to-moderate spinal canal stenosis, ventral cord contour indentation, and severe bilateral foraminal stenosis.  Mild spinal canal narrowing at C4-C5 and severe right greater than left foraminal impingement.  -saw STANLEY Wolfe on 4/7/23--> no surgery recommended  -improved on Lyrica 75 mg  po BID     Depression  C/o depressed mood; had been on Cymbalta x 9 months under care of Dr Becerra, though did not tolerate due flat affect and impotence;   -wife diagnosed with metastatic breast cancer (Onc Dr Santos)  -weaned off mirtazapine 30 mg po at bedtime  -on Wellbutrin  mg po BID     CONCEPCIÓN  +snoring; +unrefreshing sleep; +daytime hypersomnolence;   Severe by sleep study at [facility] [place]; pt is compliant with CPAP and benefits from its use;  -on autoPAP 9-15 CWP; Rx per Jenni Vargas, NP, [facility] [place]     dyspnea  -check CT chest in June 2022 shows basilar predominant subpleural reticular and reticulonodular opacities throughout both lungs.  -s/p Advair 500/50 one puff BID and albuterol HFA 2 p QID prn  -s/p prednisone taper  -nuclear stress test negative for myocardial perfusion in Nov 2016  -repeat CT chest in Oct 2022 showed stable findings;   -PFTs normal in June 202  - F/U  Dr Fernando in March 2023     Dilated pulmonary artery  As seen on CT chest; last ECHO in July 2020  ECHO showed normal pulmonary artery pressure; no evidence of pulmonary HTN      Peripheral neuropathy  Affects both feet; B12 level 1090 in June 2020;  use of Nexium may be inhibiting oral absorption of B12  -start gabapentin 100 mg po TID     GERD (gastroesophageal reflux disease)  On esomeprazole 40 mg po qD; s/p laparoscopic fundoplication in July 2010 by Dr Matute at [place]; fundoplication may have since failed; s/p EGD in March 2016 by MICAH Clemente      PSA screening  Pt had elevated PSA of 5.7 in April 2015, and repeat PSA in Oct 2015 was 3.4; repeat PSA 2.54 in Nov 2016; repeat PSA 2.84 in June 2017, and most recent PSA 3.20 in Feb 2018; PSA 2.73 in May 2019; PSA 3.11 in June 2020; PSA 2.33 in Feb 2021; PSA 2.00 in Feb 2022     Prostatism  Nocturia x4; saw HARLEY Yeh; on alfuzosin 10 mg po qHS; stable     Family history of cancer (mother)  Pt had colonoscopy with GI Dr Giuseppe Clemente in March  2016;  -pt had colonoscopy in spring 2022 by GI Dr Santo with one polyp removed; next due in 5 years, or spring 2027     OA lumbar spine   XR L-spine with mild DJD; s/p physical therapy in 2017 under care of [facility]; reluctant to pursue facet injections; had MRI lumbar spine in 2018 under care of [facility]; suspect +LBP is most likely due to osteoarthritis; has been seeing physiatrist, Dr Fabio Becerra;s/p bilateral L2, L3, L4 and L5 medial branch block injection(s) under fluoroscopic guidance and contrast enhancement in Nov 2019 and July 2020;  -off Cymbalta 60 mg po every day; had seen Dr Becerra   -tried methocarbamol 750 mg po q6hrs prn   -saw Ortho spine Dr Abhishek Woo--> no surgery advised  -on ibuprofen 400 mg po every day prn; takes sporadically  -pt tried acupuncture with modest benefit; F/U Dr Becerra     OA right hip  S/p right THR in March 2019 by orthopedics Dr Bender     Insomnia  Takes Advil PM prn; off Ambien      ED    Cialis 10 mg po qD prn     Diverticulosis  Colonoscopy in March 2016 by Dr Clemente       Travel prophlaxis  Went to [facility] in Jan 2020, [place] 2022        MWE 2/21/24      Spent 30 minutes obtaining history, evaluating patient, discussing treatment options, diet, exercise, review of available labs and radiology reports, and completing documentation.             Orders This Visit:  No orders of the defined types were placed in this encounter.      Meds This Visit:  Requested Prescriptions     Signed Prescriptions Disp Refills    semaglutide-weight management 0.25 MG/0.5ML Subcutaneous Solution Auto-injector 2 mL 0     Sig: Inject 0.5 mL (0.25 mg total) into the skin once a week for 4 doses.    hydrALAZINE 25 MG Oral Tab 42 tablet 3     Sig: Take 1 tablet (25 mg total) by mouth 3 (three) times daily as needed (SBP > 160).       Imaging & Referrals:  None     5/17/2024  Colin Maldonado MD

## 2024-05-19 RX ORDER — CARVEDILOL 6.25 MG/1
6.25 TABLET ORAL 2 TIMES DAILY
Qty: 180 TABLET | Refills: 3 | Status: CANCELLED | OUTPATIENT
Start: 2024-05-19

## 2024-05-22 ENCOUNTER — TELEPHONE (OUTPATIENT)
Dept: INTERNAL MEDICINE CLINIC | Facility: CLINIC | Age: 79
End: 2024-05-22

## 2024-05-22 NOTE — TELEPHONE ENCOUNTER
[employer] pharmacist called requesting to speak to a nurse in regards to Ozempic prescription     Please return their call, [phone] option #3

## 2024-05-22 NOTE — TELEPHONE ENCOUNTER
To CARMEN Tam...    Called and spoke with Luis/ [facility] Pharmacist. He stated that Ozempic prescription is under review at this time and he needed to know Pt diagnoses on medication prescription. I provided him with  diagnoses of Obesity, History of CVA and benign hypertension as he requested. Stated pt will be notified within 24 hours if prescription is authorized

## 2024-06-05 NOTE — TELEPHONE ENCOUNTER
Laure, Clinical Review Pharmacist from [facility], [phone], called regarding Appeal of previous decision that patient submitted for Ozempic.   Pharmacist needs to confirm diagnosis.   Please use Ref#[id number] when calling Laure back.

## 2024-06-06 NOTE — TELEPHONE ENCOUNTER
See Laurel note 5/22 where clearly the diagnosis was given;   if [employer] calls back, pls request all correspondence be faxed

## 2024-06-13 ENCOUNTER — TELEPHONE (OUTPATIENT)
Dept: INTERNAL MEDICINE CLINIC | Facility: CLINIC | Age: 79
End: 2024-06-13

## 2024-06-13 NOTE — TELEPHONE ENCOUNTER
Patients wife Daniela called.Patient was denied Ozempic from insurance . Dr DUMONT\"Randell told patients wife to talk to you about an alternative medication if insurance denied Ozempic. Please call and advise.    #[phone]

## 2024-06-25 NOTE — TELEPHONE ENCOUNTER
Wife Daniela called    Requests call back to their home     phone#[phone]    Dr Maldonado told patients wife to talk to you about an alternative medication if insurance denied Ozempic     Apologizes for delay returning your call  Patient didn't see voicemail left on his cell

## 2024-06-26 NOTE — TELEPHONE ENCOUNTER
Wife called back today stating she forgot that on Thursday she will not be home.  So, she is asking Anel to please call her on her cell # which is [phone].  She apologizes to everyone.

## 2024-06-27 NOTE — TELEPHONE ENCOUNTER
Daniela  called .Bart lost text message with the phone number he needs to set up the account. Can you please resend message with phone umber.She apologizes.

## 2024-06-27 NOTE — TELEPHONE ENCOUNTER
Spoke to wife/ mychart sent - ready to use compounded semaglutide  GLP-1 agonist ordered by MD:   yes  Discussed MOA, advised side effects and adverse effects of medication   yes  Discussed self administration technique:  will call if needed when med received  Discussed possible side effects not limited to N/V/abdominal pain/pancreatitis :  yes  Denies any personal or family history of pancreatitis, pancreatic cancer, thyroid cancer, MEN2   yes  Discussed small, potential risk of pancreatitis and pre-cancerous changes in the pancreas:  yes    Rx sent;  Pt advised to follow up with office in 5-7 weeks or will call sooner with any issues    Faxed order to [facility] pharmacy [phone]

## 2024-09-16 ENCOUNTER — TELEPHONE (OUTPATIENT)
Dept: INTERNAL MEDICINE CLINIC | Facility: CLINIC | Age: 79
End: 2024-09-16

## 2024-09-17 ENCOUNTER — PATIENT MESSAGE (OUTPATIENT)
Dept: INTERNAL MEDICINE CLINIC | Facility: CLINIC | Age: 79
End: 2024-09-17

## 2024-09-17 NOTE — TELEPHONE ENCOUNTER
Dr. SHON Cooper has been taking 0.25mg weekly. Reports no weight loss. Should dose be adjusted? Pended. Anel out of office

## 2024-09-17 NOTE — TELEPHONE ENCOUNTER
From: Bart Sheets  To: Colin Maldonado  Sent: 9/17/2024 5:12 PM CDT  Subject: Refill    Dr. Schwartz,    Very sorry but my wife gave me the wrong address. Per Anel Valencia the pharmacist, the prescription refill should be sent to [facility]. I am very sorry f9or the confusion.    Thank You,    Fahad Sheets

## 2024-09-19 NOTE — TELEPHONE ENCOUNTER
LEFT MESSAGE TO CALL BACK - original order sent in June was for titration;  need to confirm what he has been getting

## 2024-09-20 NOTE — TELEPHONE ENCOUNTER
Spoke to pt - reviewed TLC measures;  pt has not changed what he is eating and has not lost any wt;   no ADRs reported;  Increase to 1.7 mg weekly and if still no effects then consider dc  Faxed order to [place] compounding pharmacy

## 2025-01-04 ENCOUNTER — PATIENT MESSAGE (OUTPATIENT)
Dept: INTERNAL MEDICINE CLINIC | Facility: CLINIC | Age: 80
End: 2025-01-04

## 2025-01-06 ENCOUNTER — TELEPHONE (OUTPATIENT)
Dept: INTERNAL MEDICINE CLINIC | Facility: CLINIC | Age: 80
End: 2025-01-06

## 2025-01-06 RX ORDER — FUROSEMIDE 20 MG/1
20 TABLET ORAL DAILY
Qty: 90 TABLET | Refills: 3 | Status: SHIPPED | OUTPATIENT
Start: 2025-01-06

## 2025-01-06 NOTE — TELEPHONE ENCOUNTER
To Dr. DUMONT:    Please advise on below [employer] [facility] [employer] messages.    Furosemide marked discontinued 12/2023, however pt has continued taking Furosemide 20mg/daily.    Refill pended.     Bart Sheets to [facility] (supporting Colin Maldonado MD)         1/6/25 12:00 PM  Garry Leggett,  Dr. Schwartz prescribed it as of 11/30/23. That’s what it says on the prescription. Perhaps I no longer need to take it?  Thanks for replying.  Fahad Sheets to [facility] (supporting Colin Maldonado MD)         1/4/25  9:32 AM  David Tam,     For some reason the medication I have been taking (FUROSEMIDE, 20 mg once a day)for over a year is not on my medication list so I am unable to request a renewal on [facility]. I have a one week supply remaining so I would like to renew this prescription via [facility] on [address].

## 2025-01-29 RX ORDER — CARVEDILOL 6.25 MG/1
6.25 TABLET ORAL 2 TIMES DAILY
Qty: 180 TABLET | Refills: 3 | Status: SHIPPED | OUTPATIENT
Start: 2025-01-29

## 2025-01-29 NOTE — TELEPHONE ENCOUNTER
Refill request is for a maintenance medication and has met the criteria specified in the Ambulatory Medication Refill Standing Order for eligibility, visits, laboratory, alerts and was sent to the requested pharmacy.    Requested Prescriptions     Signed Prescriptions Disp Refills    carvedilol 6.25 MG Oral Tab 180 tablet 3     Sig: Take 1 tablet by mouth twice daily     Authorizing Provider: CHANTELL KING     Ordering User: JONATHAN HORVATH

## 2025-02-24 ENCOUNTER — TELEPHONE (OUTPATIENT)
Dept: INTERNAL MEDICINE CLINIC | Facility: CLINIC | Age: 80
End: 2025-02-24

## 2025-02-25 RX ORDER — BUPROPION HYDROCHLORIDE 150 MG/1
150 TABLET ORAL 2 TIMES DAILY
Qty: 180 TABLET | Refills: 0 | Status: SHIPPED | OUTPATIENT
Start: 2025-02-25

## 2025-02-25 NOTE — TELEPHONE ENCOUNTER
Sawt was sent yesterday to schedule visit.     Refill request is for a maintenance medication and has met the criteria specified in the Ambulatory Medication Refill Standing Order for eligibility, visits, laboratory, alerts and was sent to the requested pharmacy.    Requested Prescriptions     Signed Prescriptions Disp Refills    buPROPion  MG Oral Tablet 24 Hr 180 tablet 0     Sig: TAKE 1 TABLET IN THE MORNING AND BEFORE BEDTIME     Authorizing Provider: CHANTELL KING     Ordering User: TRANG REN

## 2025-02-27 RX ORDER — ALFUZOSIN HYDROCHLORIDE 10 MG/1
10 TABLET, EXTENDED RELEASE ORAL DAILY
Qty: 90 TABLET | Refills: 0 | Status: SHIPPED | OUTPATIENT
Start: 2025-02-27

## 2025-02-27 RX ORDER — ESOMEPRAZOLE MAGNESIUM 40 MG/1
40 CAPSULE, DELAYED RELEASE ORAL
Qty: 90 CAPSULE | Refills: 0 | Status: SHIPPED | OUTPATIENT
Start: 2025-02-27

## 2025-02-27 RX ORDER — ATORVASTATIN CALCIUM 20 MG/1
20 TABLET, FILM COATED ORAL NIGHTLY
Qty: 90 TABLET | Refills: 0 | Status: SHIPPED | OUTPATIENT
Start: 2025-02-27

## 2025-02-27 NOTE — TELEPHONE ENCOUNTER
Refill request is for a maintenance medication and has met the criteria specified in the Ambulatory Medication Refill Standing Order for eligibility, visits, laboratory, alerts and was sent to the requested pharmacy.    Requested Prescriptions     Signed Prescriptions Disp Refills    ATORVASTATIN 20 MG Oral Tab 90 tablet 0     Sig: TAKE 1 TABLET NIGHTLY     Authorizing Provider: CHANTELL KING     Ordering User: CANDY PATTEN    ESOMEPRAZOLE MAGNESIUM 40 MG Oral Capsule Delayed Release 90 capsule 0     Sig: TAKE 1 CAPSULE BEFORE BREAKFAST     Authorizing Provider: CHANTELL KING     Ordering User: CANDY PATTEN    ALFUZOSIN ER 10 MG Oral Tablet 24 Hr 90 tablet 0     Sig: TAKE 1 TABLET DAILY     Authorizing Provider: CHANTELL KING     Ordering User: CANDY PATTEN

## 2025-03-05 ENCOUNTER — OFFICE VISIT (OUTPATIENT)
Dept: INTERNAL MEDICINE CLINIC | Facility: CLINIC | Age: 80
End: 2025-03-05
Payer: MEDICARE

## 2025-03-05 VITALS
DIASTOLIC BLOOD PRESSURE: 74 MMHG | HEART RATE: 56 BPM | HEIGHT: 69 IN | BODY MASS INDEX: 35.37 KG/M2 | TEMPERATURE: 98 F | OXYGEN SATURATION: 97 % | WEIGHT: 238.81 LBS | SYSTOLIC BLOOD PRESSURE: 118 MMHG

## 2025-03-05 DIAGNOSIS — R60.0 BILATERAL LOWER EXTREMITY EDEMA: ICD-10-CM

## 2025-03-05 DIAGNOSIS — K57.90 DIVERTICULOSIS: ICD-10-CM

## 2025-03-05 DIAGNOSIS — E66.812 OBESITY, CLASS II, BMI 35-39.9: ICD-10-CM

## 2025-03-05 DIAGNOSIS — Z00.00 PHYSICAL EXAM, ANNUAL: Primary | ICD-10-CM

## 2025-03-05 DIAGNOSIS — M47.816 SPONDYLOSIS OF LUMBAR REGION WITHOUT MYELOPATHY OR RADICULOPATHY: ICD-10-CM

## 2025-03-05 DIAGNOSIS — I51.89 DIASTOLIC DYSFUNCTION: ICD-10-CM

## 2025-03-05 DIAGNOSIS — N40.0 PROSTATISM: ICD-10-CM

## 2025-03-05 DIAGNOSIS — R06.00 DYSPNEA, UNSPECIFIED TYPE: ICD-10-CM

## 2025-03-05 DIAGNOSIS — I28.8 DILATION OF PULMONARY ARTERY (HCC): ICD-10-CM

## 2025-03-05 DIAGNOSIS — M54.12 CERVICAL RADICULOPATHY: ICD-10-CM

## 2025-03-05 DIAGNOSIS — Z80.0 FAMILY HISTORY OF COLON CANCER: ICD-10-CM

## 2025-03-05 DIAGNOSIS — I63.9 CEREBROVASCULAR ACCIDENT (CVA), UNSPECIFIED MECHANISM (HCC): ICD-10-CM

## 2025-03-05 DIAGNOSIS — Z12.5 SCREENING PSA (PROSTATE SPECIFIC ANTIGEN): ICD-10-CM

## 2025-03-05 DIAGNOSIS — G47.33 OSA (OBSTRUCTIVE SLEEP APNEA): ICD-10-CM

## 2025-03-05 DIAGNOSIS — M16.12 PRIMARY OSTEOARTHRITIS OF LEFT HIP: ICD-10-CM

## 2025-03-05 DIAGNOSIS — F32.A DEPRESSION, UNSPECIFIED DEPRESSION TYPE: ICD-10-CM

## 2025-03-05 DIAGNOSIS — G47.00 INSOMNIA, UNSPECIFIED TYPE: ICD-10-CM

## 2025-03-05 DIAGNOSIS — G62.9 PERIPHERAL POLYNEUROPATHY: ICD-10-CM

## 2025-03-05 DIAGNOSIS — I10 BENIGN HYPERTENSION: ICD-10-CM

## 2025-03-05 DIAGNOSIS — N50.89 SCROTAL EDEMA: ICD-10-CM

## 2025-03-05 DIAGNOSIS — N52.9 ERECTILE DYSFUNCTION, UNSPECIFIED ERECTILE DYSFUNCTION TYPE: ICD-10-CM

## 2025-03-05 DIAGNOSIS — E78.00 HYPERCHOLESTEREMIA: ICD-10-CM

## 2025-03-05 DIAGNOSIS — K21.9 GASTROESOPHAGEAL REFLUX DISEASE WITHOUT ESOPHAGITIS: ICD-10-CM

## 2025-03-05 DIAGNOSIS — R60.0 EDEMA OF RIGHT UPPER ARM: ICD-10-CM

## 2025-03-05 DIAGNOSIS — M16.11 PRIMARY OSTEOARTHRITIS OF RIGHT HIP: ICD-10-CM

## 2025-03-05 RX ORDER — ESOMEPRAZOLE MAGNESIUM 40 MG/1
40 CAPSULE, DELAYED RELEASE ORAL
Qty: 90 CAPSULE | Refills: 3 | Status: SHIPPED | OUTPATIENT
Start: 2025-05-12

## 2025-03-05 RX ORDER — LISINOPRIL 20 MG/1
20 TABLET ORAL DAILY
Qty: 90 TABLET | Refills: 3 | Status: SHIPPED | OUTPATIENT
Start: 2025-04-01

## 2025-03-05 RX ORDER — CARVEDILOL 6.25 MG/1
6.25 TABLET ORAL 2 TIMES DAILY
Qty: 180 TABLET | Refills: 3 | Status: SHIPPED | OUTPATIENT
Start: 2025-04-14

## 2025-03-05 RX ORDER — BUPROPION HYDROCHLORIDE 150 MG/1
150 TABLET ORAL 2 TIMES DAILY
Qty: 180 TABLET | Refills: 3 | Status: SHIPPED | OUTPATIENT
Start: 2025-05-12

## 2025-03-05 RX ORDER — ATORVASTATIN CALCIUM 20 MG/1
20 TABLET, FILM COATED ORAL NIGHTLY
Qty: 90 TABLET | Refills: 3 | Status: SHIPPED | OUTPATIENT
Start: 2025-05-12

## 2025-03-05 RX ORDER — ALFUZOSIN HYDROCHLORIDE 10 MG/1
10 TABLET, EXTENDED RELEASE ORAL DAILY
Qty: 90 TABLET | Refills: 3 | Status: SHIPPED | OUTPATIENT
Start: 2025-05-12

## 2025-03-05 NOTE — PROGRESS NOTES
Bart Sheets is a 79 year old male.    HPI:     Chief Complaint   Patient presents with    Physical   chief complaint: presents for both AWV and follow up for chronic conditions and/or medication refills,      78 y/o M here for subsequent Medicare annual wellness exam; depression controlled with Wellbutrin  mg po BID; tried Lexapro 10 mg po every day in March 2024, then stopped due to lack of benefit; tried Wegovy for weight loss x 3 months, then stopped, as it was ineffective; h/o CVA on 11/7/23; still with hand weakness, though improved; finished OT; s/p PT at [facility] in [place]; finished Jan 2025;  has improved hand mobility though gait imbalance persists; BP stable on carvedilol 6.25 mg po BID, lisinopril 20 mg po every day;  no CP; no SOB; no headaches; no palpitation;  autoPAP device is no longer operating         HISTORY:  Past Medical History:    ACL tear    left ACL tear s/p reconstruction: Ortho Dr. Holly - repair 3/27/12    Aortic sclerosis    mild AI    Depression    ED (erectile dysfunction)    Esophageal reflux    Hiatal hernia    History of ankle fracture    s/p casting 2005    Lipid screening    per  health maintenance PPD    Osteoarthritis of right hip    s/p right THR in March 2019; orthopedics Dr Bender    Stroke (cerebrum) (HCC)      Past Surgical History:   Procedure Laterality Date    Electrocardiogram, complete  8/31/2012    scanned to media tab    Hip replacement surgery Right 03/13/2018    Other surgical history  3/27/2012    ACL repair    Other surgical history      s/p laparoscopic nissen fundoplication      Family History   Problem Relation Age of Onset    Diabetes Father         cause of death    Cancer Mother         colon      Social History:   Social History     Socioeconomic History    Marital status:    Tobacco Use    Smoking status: Former     Current packs/day: 0.00     Types: Cigarettes     Start date: 1/1/1970     Quit date: 1/1/2000     Years since  quittin.1    Smokeless tobacco: Never   Vaping Use    Vaping status: Never Used   Substance and Sexual Activity    Alcohol use: Yes     Comment: wine, 1 glass daily    Drug use: No   Other Topics Concern    Caffeine Concern Yes     Comment: coffee/soda, 2 cups    Exercise Yes   Social History Narrative    The patient does not use an assistive device..      The patient does live in a home with stairs.        Medications (Active prior to today's visit):  Current Outpatient Medications   Medication Sig Dispense Refill    [START ON 2025] alfuzosin ER 10 MG Oral Tablet 24 Hr Take 1 tablet (10 mg total) by mouth daily. 90 tablet 3    [START ON 2025] atorvastatin 20 MG Oral Tab Take 1 tablet (20 mg total) by mouth nightly. 90 tablet 3    [START ON 2025] Esomeprazole Magnesium 40 MG Oral Capsule Delayed Release Take 1 capsule (40 mg total) by mouth before breakfast. 90 capsule 3    [START ON 2025] buPROPion  MG Oral Tablet 24 Hr Take 1 tablet (150 mg total) by mouth 2 (two) times daily. 180 tablet 3    [START ON 2025] lisinopril 20 MG Oral Tab Take 1 tablet (20 mg total) by mouth daily. 90 tablet 3    [START ON 2025] carvedilol 6.25 MG Oral Tab Take 1 tablet (6.25 mg total) by mouth 2 (two) times daily. 180 tablet 3    furosemide 20 MG Oral Tab Take 1 tablet (20 mg total) by mouth daily. 90 tablet 3    Tadalafil 20 MG Oral Tab Take 1 tablet (20 mg total) by mouth daily as needed for Erectile Dysfunction. 30 tablet 3    aspirin 81 MG Oral Tab EC Take 1 tablet (81 mg total) by mouth daily.      hydrALAZINE 25 MG Oral Tab Take 1 tablet (25 mg total) by mouth 3 (three) times daily as needed (SBP > 160). (Patient not taking: Reported on 3/5/2025) 42 tablet 3       Allergies:  Allergies[1]        General Health     In the past six months, have you lost more than 10 pounds without trying?: 2 - No    Has your appetite been poor?: No    Type of Diet: Balanced    How does the patient maintain a  good energy level?: Stretching    How would you describe your daily physical activity?: Light    How would you describe your current health state?: Good    How do you maintain positive mental well-being?: Social Interaction, Visiting Friends, Visiting Family         Have you had any immunizations at another office such as Influenza, Hepatitis B, Tetanus, or Pneumococcal?: No     Functional Ability     Bathing or Showering: Able without help    Toileting: Able without help    Dressing: Able without help    Eating: Able without help    Driving: Able without help    Preparing your meals: Able without help    Managing money/bills: Able without help    Taking medications as prescribed: Able without help    Are you able to afford your medications?: Yes    Hearing Problems?: No     Functional Status     Hearing Problems?: No    Vision Problems? : No    Difficulty walking?: Yes    Difficulty dressing or bathing?: No    Problems with daily activities? : No    Memory Problems?: No      Fall/Risk Assessment          Have you fallen in the last 12 months?: 0-No                              Fall/Risk Scorin         Depression Screening (PHQ-2/PHQ-9): Over the LAST 2 WEEKS   Little interest or pleasure in doing things (over the last two weeks)?: Not at all     Feeling down, depressed, or hopeless (over the last two weeks)?: Not at all     PHQ-2 SCORE: 0         Advance Directives     Do you have a healthcare power of ?: Yes    Do you have a living will?: Yes     Hearing Assessment (Required for AWV/SWV)      Hearing Screening    Entry User: Digna Penn RN  Screening Method: Whisper Test  Whisper Test Result: Pass         Visual Acuity                   Cognitive Assessment     What day of the week is this?: Correct    What month is it?: Correct    What year is it?: Correct    Recall \"Ball\": Correct    Recall \"Flag\": Correct    Recall \"Tree\": Correct        Bart Sheets's SCREENING SCHEDULE   Tests on  this list are recommended by your physician but may not be covered, or covered at this frequency, by your insurer. Please check with your insurance carrier before scheduling to verify coverage.    PREVENTATIVE SERVICES  INDICATIONS AND SCHEDULE Internal Lab or Procedure External Lab or Procedure   Diabetes Screening      HbgA1C   Annually No results found for: \"A1C\"      No data to display                Fasting Blood Sugar (FSB) Annually Glucose (mg/dL)   Date Value   05/25/2023 109 (H)     GLUCOSE (mg/dL)   Date Value   04/01/2011 76       Cardiovascular Disease Screening     LDL Annually LDL Cholesterol (mg/dL)   Date Value   03/05/2019 109 (H)        EKG One Time     Colorectal Cancer Screening      Colonoscopy Screen every 10 years Health Maintenance   Topic Date Due    Colorectal Cancer Screening  Discontinued    Update Health Maintenance if applicable    Flex Sigmoidoscopy Screen every 5 years No results found for this or any previous visit.      No data to display                 Fecal Occult Blood Annually No results found for: \"FOB\", \"OCCULTSTOOL\"      No data to display                Glaucoma Screening      Ophthalmology Visit Annually     Prostate Cancer Screening      PSA  Annually There are no preventive care reminders to display for this patient.  Update Health Maintenance if applicable   Immunizations      Influenza No orders found for this or any previous visit. Update Immunization Activity if applicable    Pneumococcal No orders found for this or any previous visit. Update Immunization Activity if applicable    Hepatitis B No orders found for this or any previous visit. Update Immunization Activity if applicable    Tetanus Orders placed or performed in visit on 06/25/14    TETANUS AND DIPHTHERIA, PRESERVE FREE    Update Immunization Activity if applicable    Zoster (Not covered by Medicare Part B) No orders found for this or any previous visit. Update Immunization Activity if applicable      SPECIFIC DISEASE MONITORING Internal Lab or Procedure External Lab or Procedure   Annual Monitoring of Persistent     Medications (ACE/ARB, digoxin, diuretics)    Potassium  Annually Potassium (mmol/L)   Date Value   05/25/2023 4.0     POTASSIUM (mmol/L)   Date Value   04/01/2011 4.1     POTASSIUM (P) (mmol/L)   Date Value   04/13/2015 3.6         No data to display                Creatinine  Annually CREATININE (mg/dL)   Date Value   04/01/2011 1.01     Creatinine (mg/dL)   Date Value   05/25/2023 1.10         No data to display                Digoxin Serum Conc  Annually No results found for: \"DIGOXIN\"      No data to display                Diabetes      HgbA1C  Annually No results found for: \"A1C\"      No data to display                Creat/alb ratio  Annually      LDL  Annually LDL Cholesterol (mg/dL)   Date Value   03/05/2019 109 (H)         No data to display                 Dilated Eye exam  Annually      No data to display                   No data to display                COPD      Spirometry Testing Annually No results found for this or any previous visit.      No data to display                        ROS:   Constitutional: no weight loss; no fatigue  ENMT:  Negative for ear drainage, hearing loss and nasal drainage  Eyes:  Negative for eye discharge and vision loss  Cardiovascular:  Negative for chest pain; negative palpitations  Respiratory:  Negative for cough, dyspnea and wheezing  Endocrine:  Negative for abnormal sleep patterns, increased activity, polydipsia and polyphagia  Gastrointestinal:  Negative for abdominal pain, constipation, decreased appetite, diarrhea and vomiting; no melena or hematochezia  Musculoskeletal:  Negative for arthralgias or myalgias  Genitourinary:  Negative for dysuria or polyuria  Hema/Lymph:  Negative for easy bleeding and easy bruising  Integumentary:  Negative for pruritus and rash  Neurological:  Negative for gait disturbance; negative for paresthesias   All  other review of systems are negative.        PHYSICAL EXAM:   Blood pressure 118/74, pulse 56, temperature 97.6 °F (36.4 °C), height 5' 9\" (1.753 m), weight 238 lb 12.8 oz (108.3 kg), SpO2 97%.  Constitutional: alert and oriented x3 in no acute distress  HEENT- EOMI, PERRL  Nose/Mouth/Throat: pharynx without erythema; no oral lesions  Neck/Thyroid: neck supple; no thyromegaly  Lymphatics: no lymphadenopathy of neck or groin  Cardiovascular: RRR, S1, S2, no S3 or murmur  Respiratory: lungs without crackles or wheezes  Abdomen: normoactive bowel sounds, soft, non-tender and non-distended  Extremities: no clubbing, cyanosis or edema  Vascular: no carotid bruits; DP/PT 2/2  Musculoskeletal: Motor 5/5 upper and lower extremities  Neurological: cranial nerves II-XII intact; light touch and proprioception intact  Skin: no rash or ulcerations         ASSESSMENT/PLAN:   Physical exam      Depression  C/o depressed mood; had been on Cymbalta x 9 months in 2021 under care of Dr Becerra, though did not tolerate due flat affect and impotence;   -wife diagnosed with metastatic breast cancer (Onc Dr Santos)  -weaned off mirtazapine 30 mg po at bedtime in Dec 2023  -on Wellbutrin  mg po BID  -tried Lexapro 10 mg po every day in March 2024, then stopped due to lack of benefit     Obesity  Body mass index is 35.26 kg/m².; Wegovy may decrease risk of CV complications in addition assisting in weight loss  -tried Wegovy x 3 months, then stopped     CVA  On 11/7/23; experienced dizziness, difficulty speaking, inability to walk; has RUE, and RLE paresis; has AFO orthotic; admitted to [facility], then 11/10-23-12/8/23 at [facility]; MRI imaging reportedly showed a CVA; sxs progressed; SBP was >200 upon presentation  -on ASA 81 mg po every day; off PLavix since Sept 202l  -off baclofen 10 mg half-tab (=5 mg) po at bedtime; muscle spasms quiescent   -saw neurologist Dr Pranav Bartlett in Jan 2024, and cardiologist Dr Godwin  Cassandra in Sept 2024  -finished OT; s/p PT at [facility] in [place]; finished Jan 2025;  has improved hand  mobility though gait imbalance persists     Right hand edema  -RUE venous doppler neg DVT in jan 2024      Hypertension   -on carvedilol 6.25 mg po BID, lisinopril 20 mg po every day  -due to BLE edema, stopped nifedipine ER 30 mg po every day in May 2024  -Rx given for hydralazine 25 mg po TID prn SBP >160     Scrotal edema  -decrease nifedipine as above  -miconazole powder 2% for scrotal rash     hypercholesterolemia  LDL 12 on Feb 2024 on atorvastatin 40 mg po at bedtime;  -decreased atorvastatin to 20 mg po at bedtime in May 2024     Diastolic dysfunction  ECHO in July 2020 shows EF >55% with mild diastolic dysfunction; cont Lasix 20 mg po qD      BLE edema  Suspect venous insufficiency; stable on Lasix 20 mg po every day--> change to prn; BMP stable in Feb 2022; UA neg protenuria     Osteoarthritis of left hip  -s/p left hip LORAINE on 6/7/23 by orthopedics Dr Roberto Bender at   -EKG in May 2023 shows NSR, LAD, minimal criteria for LVH     Cervical radiculopathy  Pain in trapezius and rhomboid areas; advise therapy course of massage, stretching and strengthening; he will pursue therapy with ; pain has progressed to involve left anterior upper chest as well as pain/numbness to left hand  -s/p Medrol dose swetha x 6d  -MRI cervical spine on 2/25/23 shows multilevel degenerative changes of the cervical spine, particularly at C7-T1, where there is mild-to-moderate spinal canal stenosis, ventral cord contour indentation, and severe bilateral foraminal stenosis.  Mild spinal canal narrowing at C4-C5 and severe right greater than left foraminal impingement.  -saw NS Dr Wolfe on 4/7/23--> no surgery recommended  -no longer taking Lyrica 50 mg po BID     CONCEPCIÓN  +snoring; +unrefreshing sleep; +daytime hypersomnolence;   Severe by sleep study at [facility] sleep center; pt is compliant with CPAP and benefits from  its use;  -on autoPAP 9-15 CWP; Rx per Jenni Vargas NP, [facility]; DME [employer]; device is no longer operating      dyspnea  -check CT chest in June 2022 shows basilar predominant subpleural reticular and reticulonodular opacities throughout both lungs.  -s/p Advair 500/50 one puff BID and albuterol HFA 2 p QID prn  -s/p prednisone taper  -nuclear stress test negative for myocardial perfusion in Nov 2016  -repeat CT chest in Oct 2022 showed stable findings;   -PFTs normal in June 202  - F/U  Dr Fernando in March 2023     Dilated pulmonary artery  As seen on CT chest; last ECHO in July 2020  ECHO showed normal pulmonary artery pressure; no evidence of pulmonary HTN      Peripheral neuropathy  Affects both feet; B12 level 1090 in June 2020;  use of Nexium may be inhibiting oral absorption of B12  -no response to gabapentin 100 mg po TID  -on no Meds     GERD (gastroesophageal reflux disease)  On esomeprazole 40 mg po qD; s/p laparoscopic fundoplication in July 2010 by Dr Matute at [place]; fundoplication may have since failed; s/p EGD in March 2016 by MICAH Clemente      PSA screening  Pt had elevated PSA of 5.7 in April 2015, and repeat PSA in Oct 2015 was 3.4; repeat PSA 2.54 in Nov 2016; repeat PSA 2.84 in June 2017, and most recent PSA 3.20 in Feb 2018; PSA 2.73 in May 2019; PSA 3.11 in June 2020; PSA 2.33 in Feb 2021; PSA 2.00 in Feb 2022; 3.44 in Feb 2023      Prostatism  Nocturia x4; saw  Dr Yeh; on alfuzosin 10 mg po qHS; stable     Family history of cancer (mother)  Pt had colonoscopy with GI Dr Giuseppe Clemente in March 2016;  -pt had colonoscopy in spring 2022 by GI Dr Santo with one polyp removed; next due in 5 years, or spring 2027     OA lumbar spine   XR L-spine with mild DJD; s/p physical therapy in 2017 under care of [facility]; reluctant to pursue facet injections; had MRI lumbar spine in 2018 under care of [facility]; suspect +LBP is most likely due to  osteoarthritis; has been seeing physiatrist, Dr Fabio Becerra;s/p bilateral L2, L3, L4 and L5 medial branch block injection(s) under fluoroscopic guidance and contrast enhancement in Nov 2019 and July 2020;  -off Cymbalta 60 mg po every day; had seen Dr Becerra   -tried methocarbamol 750 mg po q6hrs prn   -saw Ortho spine Dr Abhishek Woo--> no surgery advised  -on ibuprofen 400 mg po every day prn; takes sporadically  -pt tried acupuncture with modest benefit; F/U Dr Becerra     OA right hip  S/p right THR in March 2019 by orthopedics Dr Bender     Insomnia  Takes Advil PM prn; off Ambien      ED    Cialis 10 mg po qD prn     Diverticulosis  Colonoscopy in March 2016 by Dr Clemente     Travel prophlaxis  Went to [facility] in Jan 2020, [place] 2022        MWE 3/5/25      Spent 30 minutes obtaining history, evaluating patient, discussing treatment options, diet, exercise, review of available labs and radiology reports, and completing documentation.                Orders This Visit:  No orders of the defined types were placed in this encounter.      Meds This Visit:  Requested Prescriptions     Signed Prescriptions Disp Refills    alfuzosin ER 10 MG Oral Tablet 24 Hr 90 tablet 3     Sig: Take 1 tablet (10 mg total) by mouth daily.    atorvastatin 20 MG Oral Tab 90 tablet 3     Sig: Take 1 tablet (20 mg total) by mouth nightly.    Esomeprazole Magnesium 40 MG Oral Capsule Delayed Release 90 capsule 3     Sig: Take 1 capsule (40 mg total) by mouth before breakfast.    buPROPion  MG Oral Tablet 24 Hr 180 tablet 3     Sig: Take 1 tablet (150 mg total) by mouth 2 (two) times daily.    lisinopril 20 MG Oral Tab 90 tablet 3     Sig: Take 1 tablet (20 mg total) by mouth daily.    carvedilol 6.25 MG Oral Tab 180 tablet 3     Sig: Take 1 tablet (6.25 mg total) by mouth 2 (two) times daily.       Imaging & Referrals:  None     3/5/2025  Colin Maldonado MD               [1] No Known Allergies

## 2025-03-25 ENCOUNTER — PATIENT MESSAGE (OUTPATIENT)
Dept: INTERNAL MEDICINE CLINIC | Facility: CLINIC | Age: 80
End: 2025-03-25

## 2025-03-26 ENCOUNTER — TELEPHONE (OUTPATIENT)
Dept: INTERNAL MEDICINE CLINIC | Facility: CLINIC | Age: 80
End: 2025-03-26

## 2025-03-26 NOTE — TELEPHONE ENCOUNTER
Bart Sheets  P [facility] Clinical Staff (supporting Colin Maldonado MD)11 hours ago (9:25 PM)       David Schwartz,     I am currently having a great deal of difficulty urinating and it seems to be getting worse. I wonder if you would be so kind as to recommend a urologist in my area.     I know that there is an outpatient procedure that may help in this matter.     Thank you and hope you are having a good week.     Fahad sheets

## 2025-03-27 NOTE — TELEPHONE ENCOUNTER
Garry Vásquez,    I would recommend urologist Dr Julio Cesar Hernandez    [address]  [facility] [address]    Phone: [phone]    Dr Maldonado

## 2025-05-06 ENCOUNTER — TELEPHONE (OUTPATIENT)
Dept: INTERNAL MEDICINE CLINIC | Facility: CLINIC | Age: 80
End: 2025-05-06

## 2025-05-06 ENCOUNTER — PATIENT MESSAGE (OUTPATIENT)
Dept: INTERNAL MEDICINE CLINIC | Facility: CLINIC | Age: 80
End: 2025-05-06

## 2025-05-06 DIAGNOSIS — M47.26 OSTEOARTHRITIS OF SPINE WITH RADICULOPATHY, LUMBAR REGION: Primary | ICD-10-CM

## 2025-05-06 NOTE — TELEPHONE ENCOUNTER
Bart Sheets  P [facility] Clinical Staff (supporting Colin Maldonado MD)     Hi  Dr. Schwartz,      A good friend of mine has had successful results. From him and has suggested that I see Dr. Bryce Cuevas from the [facility] Management [facility] at the [facility] here in [place]. I have tried everything to ease the debilitating pain in my lower back to no avail.  I thought seeing him might be productive. However, he isn’t seeing anyone without a referral from my primary care doctor. Would you be so kind as to provide one for me? I would be most appreciative.      Thanks for your consideration, Dr. Schwartz. Have a good week.     Fahad Sheets

## 2025-05-06 NOTE — TELEPHONE ENCOUNTER
Please call office of [facility] [phone] and request FAX number for Dr Angelina Cuevas at [facility]    Pt called; referral signed; please FAX

## 2025-05-12 RX ORDER — TADALAFIL 20 MG/1
20 TABLET ORAL
Qty: 30 TABLET | Refills: 5 | Status: SHIPPED | OUTPATIENT
Start: 2025-05-12

## 2025-05-12 NOTE — TELEPHONE ENCOUNTER
Refill request is for a maintenance medication and has met the criteria specified in the Ambulatory Medication Refill Standing Order for eligibility, visits, laboratory, alerts and was sent to the requested pharmacy.    Requested Prescriptions     Signed Prescriptions Disp Refills    Tadalafil 20 MG Oral Tab 30 tablet 5     Sig: Take 1 tablet (20 mg total) by mouth daily as needed for Erectile Dysfunction.     Authorizing Provider: CHANTELL KING     Ordering User: TRANG REN

## 2025-05-15 ENCOUNTER — TELEPHONE (OUTPATIENT)
Dept: INTERNAL MEDICINE CLINIC | Facility: CLINIC | Age: 80
End: 2025-05-15

## 2025-05-15 ENCOUNTER — PATIENT MESSAGE (OUTPATIENT)
Dept: INTERNAL MEDICINE CLINIC | Facility: CLINIC | Age: 80
End: 2025-05-15

## 2025-05-15 NOTE — TELEPHONE ENCOUNTER
MyChart converted to TE 5-15 below    Bart Sheets  P [facility] Clinical Staff (supporting Colin Maldonado MD)4 hours ago (8:24 AM)       David Schwartz,     Sorry to be a pain but [facility] won’t fill the Tedalafil prescription because they say my current insurance does not cover it even though I told them I would pay them directly.  Could you send the refill request to the [facility] on [address] instead? I would greatly appreciate it . Their number is [phone].     Thank You,     Fahad Sheets

## 2025-05-16 RX ORDER — TADALAFIL 20 MG/1
20 TABLET ORAL
Qty: 30 TABLET | Refills: 5 | Status: SHIPPED | OUTPATIENT
Start: 2025-05-16

## 2025-07-08 SDOH — ECONOMIC STABILITY: HOUSING INSECURITY: DO YOU HAVE PROBLEMS WITH ANY OF THE FOLLOWING?: NONE OF THE ABOVE

## 2025-07-08 SDOH — ECONOMIC STABILITY: TRANSPORTATION INSECURITY
IN THE PAST 12 MONTHS, HAS LACK OF RELIABLE TRANSPORTATION KEPT YOU FROM MEDICAL APPOINTMENTS, MEETINGS, WORK OR FROM GETTING THINGS NEEDED FOR DAILY LIVING?: NO

## 2025-07-08 SDOH — ECONOMIC STABILITY: FOOD INSECURITY: WITHIN THE PAST 12 MONTHS, THE FOOD YOU BOUGHT JUST DIDN'T LAST AND YOU DIDN'T HAVE MONEY TO GET MORE.: NEVER TRUE

## 2025-07-08 SDOH — ECONOMIC STABILITY: HOUSING INSECURITY: WHAT IS YOUR LIVING SITUATION TODAY?: I HAVE A STEADY PLACE TO LIVE

## 2025-07-08 ASSESSMENT — SOCIAL DETERMINANTS OF HEALTH (SDOH)
IN THE PAST 12 MONTHS, HAS THE ELECTRIC, GAS, OIL, OR WATER COMPANY THREATENED TO SHUT OFF SERVICE IN YOUR HOME?: NO
IN THE PAST 12 MONTHS, HAS THE ELECTRIC, GAS, OIL, OR WATER COMPANY THREATENED TO SHUT OFF SERVICE IN YOUR HOME?: NO

## 2025-07-10 ENCOUNTER — APPOINTMENT (OUTPATIENT)
Age: 80
End: 2025-07-10

## 2025-07-24 ENCOUNTER — APPOINTMENT (OUTPATIENT)
Age: 80
End: 2025-07-24

## 2025-08-09 SDOH — ECONOMIC STABILITY: HOUSING INSECURITY: DO YOU HAVE PROBLEMS WITH ANY OF THE FOLLOWING?: NONE OF THE ABOVE

## 2025-08-09 SDOH — ECONOMIC STABILITY: FOOD INSECURITY: WITHIN THE PAST 12 MONTHS, THE FOOD YOU BOUGHT JUST DIDN'T LAST AND YOU DIDN'T HAVE MONEY TO GET MORE.: NEVER TRUE

## 2025-08-09 SDOH — ECONOMIC STABILITY: HOUSING INSECURITY: WHAT IS YOUR LIVING SITUATION TODAY?: I HAVE A STEADY PLACE TO LIVE

## 2025-08-09 ASSESSMENT — SOCIAL DETERMINANTS OF HEALTH (SDOH): IN THE PAST 12 MONTHS, HAS THE ELECTRIC, GAS, OIL, OR WATER COMPANY THREATENED TO SHUT OFF SERVICE IN YOUR HOME?: NO

## 2025-08-11 ENCOUNTER — APPOINTMENT (OUTPATIENT)
Age: 80
End: 2025-08-11

## 2025-08-11 VITALS
SYSTOLIC BLOOD PRESSURE: 157 MMHG | HEART RATE: 56 BPM | OXYGEN SATURATION: 94 % | WEIGHT: 241.4 LBS | BODY MASS INDEX: 35.76 KG/M2 | DIASTOLIC BLOOD PRESSURE: 71 MMHG | HEIGHT: 69 IN | TEMPERATURE: 97.7 F

## 2025-08-11 DIAGNOSIS — E66.9 OBESITY (BMI 35.0-39.9 WITHOUT COMORBIDITY): ICD-10-CM

## 2025-08-11 DIAGNOSIS — I10 BENIGN HYPERTENSION: Primary | ICD-10-CM

## 2025-08-11 DIAGNOSIS — G62.9 PERIPHERAL POLYNEUROPATHY: ICD-10-CM

## 2025-08-11 DIAGNOSIS — R06.00 DYSPNEA, UNSPECIFIED TYPE: ICD-10-CM

## 2025-08-11 DIAGNOSIS — R94.31 ABNORMAL EKG: ICD-10-CM

## 2025-08-11 DIAGNOSIS — F32.0 MILD MAJOR DEPRESSION (CMD): ICD-10-CM

## 2025-08-11 DIAGNOSIS — E78.00 HYPERCHOLESTEREMIA: ICD-10-CM

## 2025-08-11 RX ORDER — TADALAFIL 20 MG/1
20 TABLET ORAL
COMMUNITY
Start: 2025-05-16

## 2025-08-11 RX ORDER — TIRZEPATIDE 7.5 MG/.5ML
7.5 INJECTION, SOLUTION SUBCUTANEOUS
Qty: 2 ML | Refills: 5 | Status: SHIPPED | OUTPATIENT
Start: 2025-10-06

## 2025-08-11 RX ORDER — ASPIRIN 81 MG/1
81 TABLET ORAL
COMMUNITY

## 2025-08-11 RX ORDER — BACLOFEN 5 MG/1
5 TABLET ORAL NIGHTLY
COMMUNITY

## 2025-08-11 RX ORDER — CARVEDILOL 6.25 MG/1
6.25 TABLET ORAL
COMMUNITY
Start: 2025-04-14

## 2025-08-11 RX ORDER — ACETAMINOPHEN 500 MG
TABLET ORAL
COMMUNITY

## 2025-08-11 RX ORDER — ATORVASTATIN CALCIUM 20 MG/1
20 TABLET, FILM COATED ORAL
COMMUNITY
Start: 2025-05-12

## 2025-08-11 RX ORDER — IBUPROFEN 200 MG
TABLET ORAL
COMMUNITY

## 2025-08-11 RX ORDER — ATOVAQUONE AND PROGUANIL HYDROCHLORIDE 250; 100 MG/1; MG/1
TABLET, FILM COATED ORAL
COMMUNITY
Start: 2025-05-29

## 2025-08-11 RX ORDER — LISINOPRIL 20 MG/1
20 TABLET ORAL DAILY
COMMUNITY
Start: 2025-04-01 | End: 2025-08-11

## 2025-08-11 RX ORDER — LISINOPRIL 30 MG/1
30 TABLET ORAL DAILY
Qty: 90 TABLET | Refills: 3 | Status: SHIPPED | OUTPATIENT
Start: 2025-08-11

## 2025-08-11 RX ORDER — CLOPIDOGREL BISULFATE 75 MG/1
75 TABLET ORAL DAILY
COMMUNITY
End: 2025-08-12

## 2025-08-11 RX ORDER — TIRZEPATIDE 2.5 MG/.5ML
2.5 INJECTION, SOLUTION SUBCUTANEOUS
Qty: 2 ML | Refills: 0 | Status: SHIPPED | OUTPATIENT
Start: 2025-08-11

## 2025-08-11 RX ORDER — TIRZEPATIDE 5 MG/.5ML
5 INJECTION, SOLUTION SUBCUTANEOUS
Qty: 2 ML | Refills: 0 | Status: SHIPPED | OUTPATIENT
Start: 2025-09-09

## 2025-08-11 ASSESSMENT — PATIENT HEALTH QUESTIONNAIRE - PHQ9
SUM OF ALL RESPONSES TO PHQ9 QUESTIONS 1 AND 2: 1
CLINICAL INTERPRETATION OF PHQ2 SCORE: NO FURTHER SCREENING NEEDED
2. FEELING DOWN, DEPRESSED OR HOPELESS: SEVERAL DAYS
SUM OF ALL RESPONSES TO PHQ9 QUESTIONS 1 AND 2: 1
1. LITTLE INTEREST OR PLEASURE IN DOING THINGS: NOT AT ALL

## 2025-08-12 ENCOUNTER — TELEPHONE (OUTPATIENT)
Age: 80
End: 2025-08-12

## 2025-08-12 PROBLEM — I10 BENIGN HYPERTENSION: Status: ACTIVE | Noted: 2025-08-12

## 2025-08-12 PROBLEM — R06.00 DYSPNEA: Status: ACTIVE | Noted: 2025-08-12

## 2025-08-12 PROBLEM — F32.0 MILD MAJOR DEPRESSION (CMD): Status: ACTIVE | Noted: 2025-08-12

## 2025-08-12 PROBLEM — G62.9 PERIPHERAL POLYNEUROPATHY: Status: ACTIVE | Noted: 2025-08-12

## 2025-08-12 PROBLEM — J44.9 CHRONIC OBSTRUCTIVE PULMONARY DISEASE  (CMD): Status: RESOLVED | Noted: 2022-06-21 | Resolved: 2025-08-12

## 2025-08-12 PROBLEM — E78.00 HYPERCHOLESTEREMIA: Status: ACTIVE | Noted: 2025-08-12

## 2025-08-19 ENCOUNTER — TELEPHONE (OUTPATIENT)
Age: 80
End: 2025-08-19

## 2025-08-20 ENCOUNTER — E-ADVICE (OUTPATIENT)
Age: 80
End: 2025-08-20

## 2025-08-22 ENCOUNTER — LAB SERVICES (OUTPATIENT)
Dept: LAB | Age: 80
End: 2025-08-22

## 2025-08-22 DIAGNOSIS — E78.00 HYPERCHOLESTEREMIA: ICD-10-CM

## 2025-08-22 DIAGNOSIS — G62.9 PERIPHERAL POLYNEUROPATHY: ICD-10-CM

## 2025-08-22 DIAGNOSIS — I10 BENIGN HYPERTENSION: ICD-10-CM

## 2025-08-22 LAB
ALBUMIN SERPL-MCNC: 3.5 G/DL (ref 3.4–5)
ALBUMIN/GLOB SERPL: 1.1 {RATIO} (ref 1–2.4)
ALP SERPL-CCNC: 76 UNITS/L (ref 45–117)
ALT SERPL-CCNC: 38 UNITS/L
ANION GAP SERPL CALC-SCNC: 9 MMOL/L (ref 7–19)
AST SERPL-CCNC: 19 UNITS/L
BASOPHILS # BLD: 0.1 K/MCL (ref 0–0.3)
BASOPHILS NFR BLD: 1 %
BILIRUB SERPL-MCNC: 0.8 MG/DL (ref 0.2–1)
BUN SERPL-MCNC: 21 MG/DL (ref 6–20)
BUN/CREAT SERPL: 19 (ref 7–25)
CALCIUM SERPL-MCNC: 8.8 MG/DL (ref 8.4–10.2)
CHLORIDE SERPL-SCNC: 108 MMOL/L (ref 97–110)
CHOLEST SERPL-MCNC: 101 MG/DL
CHOLEST/HDLC SERPL: 2.3 {RATIO}
CO2 SERPL-SCNC: 28 MMOL/L (ref 21–32)
CREAT SERPL-MCNC: 1.1 MG/DL (ref 0.67–1.17)
DEPRECATED RDW RBC: 41.1 FL (ref 39–50)
EGFRCR SERPLBLD CKD-EPI 2021: 68 ML/MIN/{1.73_M2}
EOSINOPHIL # BLD: 0.2 K/MCL (ref 0–0.5)
EOSINOPHIL NFR BLD: 2 %
ERYTHROCYTE [DISTWIDTH] IN BLOOD: 12 % (ref 11–15)
FASTING DURATION TIME PATIENT: 12 HOURS (ref 0–999)
FOLATE SERPL-MCNC: 7.2 NG/ML
GLOBULIN SER-MCNC: 3.1 G/DL (ref 2–4)
GLUCOSE SERPL-MCNC: 108 MG/DL (ref 70–99)
HCT VFR BLD CALC: 42.8 % (ref 39–51)
HDLC SERPL-MCNC: 44 MG/DL
HGB BLD-MCNC: 14 G/DL (ref 13–17)
IMM GRANULOCYTES # BLD AUTO: 0.1 K/MCL (ref 0–0.2)
IMM GRANULOCYTES # BLD: 1 %
LDLC SERPL CALC-MCNC: 36 MG/DL
LYMPHOCYTES # BLD: 2.3 K/MCL (ref 1–4)
LYMPHOCYTES NFR BLD: 21 %
MCH RBC QN AUTO: 30.9 PG (ref 26–34)
MCHC RBC AUTO-ENTMCNC: 32.7 G/DL (ref 32–36.5)
MCV RBC AUTO: 94.5 FL (ref 78–100)
MONOCYTES # BLD: 0.7 K/MCL (ref 0.3–0.9)
MONOCYTES NFR BLD: 7 %
NEUTROPHILS # BLD: 7.5 K/MCL (ref 1.8–7.7)
NEUTROPHILS NFR BLD: 68 %
NONHDLC SERPL-MCNC: 57 MG/DL
NRBC BLD MANUAL-RTO: 0 /100 WBC
PLATELET # BLD AUTO: 231 K/MCL (ref 140–450)
POTASSIUM SERPL-SCNC: 4 MMOL/L (ref 3.4–5.1)
PROT SERPL-MCNC: 6.6 G/DL (ref 6.4–8.2)
RBC # BLD: 4.53 MIL/MCL (ref 4.5–5.9)
SODIUM SERPL-SCNC: 141 MMOL/L (ref 135–145)
TRIGL SERPL-MCNC: 104 MG/DL
TSH SERPL-ACNC: 2.62 MCUNITS/ML (ref 0.35–5)
VIT B12 SERPL-MCNC: 681 PG/ML (ref 211–911)
WBC # BLD: 10.8 K/MCL (ref 4.2–11)

## 2025-08-25 ENCOUNTER — E-ADVICE (OUTPATIENT)
Age: 80
End: 2025-08-25

## 2025-08-25 LAB
DSDNA IGG SERPL IA-ACNC: 1.9 IU/ML
NUCLEAR IGG SER IA-RTO: 0.2 RATIO
U1 SNRNP IGG SER IA-ACNC: 1 U/ML

## 2025-08-25 RX ORDER — CARVEDILOL 6.25 MG/1
6.25 TABLET ORAL 2 TIMES DAILY WITH MEALS
Qty: 180 TABLET | Refills: 3 | Status: SHIPPED | OUTPATIENT
Start: 2025-08-25

## 2025-08-26 LAB
ANCA AB PATTERN SER IF-IMP: NORMAL
ANCA IGG TITR SER IF: NORMAL {TITER}

## 2025-08-27 ENCOUNTER — HOSPITAL ENCOUNTER (OUTPATIENT)
Dept: CARDIOLOGY | Age: 80
Discharge: HOME | End: 2025-08-27
Attending: INTERNAL MEDICINE

## 2025-08-27 ENCOUNTER — HOSPITAL ENCOUNTER (OUTPATIENT)
Age: 80
Discharge: HOME | End: 2025-08-27
Attending: INTERNAL MEDICINE

## 2025-08-27 DIAGNOSIS — I10 BENIGN HYPERTENSION: Primary | ICD-10-CM

## 2025-08-27 DIAGNOSIS — I10 BENIGN HYPERTENSION: ICD-10-CM

## 2025-08-27 DIAGNOSIS — R94.31 ABNORMAL EKG: ICD-10-CM

## 2025-08-27 DIAGNOSIS — R06.00 DYSPNEA, UNSPECIFIED TYPE: ICD-10-CM

## 2025-08-27 LAB
AORTIC VALVE AREA (AVA): 0.91
ASCENDING AORTA (AAD): 8
AV MEAN PRESS GRAD SYS DOP V1V2: 10 MM[HG]
AV MEAN VELOCITY (AVMV): 1.39
AV ORIFICE AREA US: 2.85 CM2
AV PEAK GRADIENT (AVPG): 18
AV STENOSIS SEVERITY TEXT: NORMAL
AV VMAX SYS DOP: 2.1
E WAVE DECELARATION TIME (MDT): 16.67
INTERVENTRICULAR SEPTUM IN END DIASTOLE (IVSD): 2.92
LV EF 2D ECHO EST: NORMAL %
LVOT 2D (LVOTD): 32.1
LVOT VTI (LVOTVTI): 1.64
MV E TISSUE VEL MED (MESV): 6.84
MV E WAVE VEL/E TISSUE VEL MED(MSR): 5.44
MV PEAK A VELOCITY (MVPAV): 256
MV PEAK E VELOCITY (MVPEV): 1.25
RV END SYSTOLIC LONGITUDINAL STRAIN FREE WALL (RVGS): 2.1
TRICUSPID VALVE PEAK REGURGITATION VELOCITY (TRPV): 3.3
TV ESTIMATED RIGHT ARTERIAL PRESSURE (RAP): 19

## 2025-08-27 PROCEDURE — 93017 CV STRESS TEST TRACING ONLY: CPT

## 2025-08-27 PROCEDURE — 93018 CV STRESS TEST I&R ONLY: CPT | Performed by: INTERNAL MEDICINE

## 2025-08-27 PROCEDURE — 78452 HT MUSCLE IMAGE SPECT MULT: CPT | Performed by: INTERNAL MEDICINE

## 2025-08-27 PROCEDURE — 10002800 HB RX 250 W HCPCS: Performed by: INTERNAL MEDICINE

## 2025-08-27 PROCEDURE — 93306 TTE W/DOPPLER COMPLETE: CPT

## 2025-08-27 PROCEDURE — A9500 TC99M SESTAMIBI: HCPCS | Performed by: INTERNAL MEDICINE

## 2025-08-27 PROCEDURE — 10006150 HB RX 343: Performed by: INTERNAL MEDICINE

## 2025-08-27 PROCEDURE — 93016 CV STRESS TEST SUPVJ ONLY: CPT | Performed by: INTERNAL MEDICINE

## 2025-08-27 PROCEDURE — 10002805 HB CONTRAST AGENT: Performed by: INTERNAL MEDICINE

## 2025-08-27 PROCEDURE — 78452 HT MUSCLE IMAGE SPECT MULT: CPT

## 2025-08-27 PROCEDURE — 93306 TTE W/DOPPLER COMPLETE: CPT | Performed by: INTERNAL MEDICINE

## 2025-08-27 PROCEDURE — 93005 ELECTROCARDIOGRAM TRACING: CPT | Performed by: INTERNAL MEDICINE

## 2025-08-27 RX ORDER — REGADENOSON 0.08 MG/ML
0.4 INJECTION, SOLUTION INTRAVENOUS ONCE
Status: COMPLETED | OUTPATIENT
Start: 2025-08-27 | End: 2025-08-27

## 2025-08-27 RX ORDER — TETRAKIS(2-METHOXYISOBUTYLISOCYANIDE)COPPER(I) TETRAFLUOROBORATE 1 MG/ML
24 INJECTION, POWDER, LYOPHILIZED, FOR SOLUTION INTRAVENOUS ONCE
Status: COMPLETED | OUTPATIENT
Start: 2025-08-27 | End: 2025-08-27

## 2025-08-27 RX ORDER — TETRAKIS(2-METHOXYISOBUTYLISOCYANIDE)COPPER(I) TETRAFLUOROBORATE 1 MG/ML
8 INJECTION, POWDER, LYOPHILIZED, FOR SOLUTION INTRAVENOUS ONCE
Status: COMPLETED | OUTPATIENT
Start: 2025-08-27 | End: 2025-08-27

## 2025-08-27 RX ADMIN — TETRAKIS(2-METHOXYISOBUTYLISOCYANIDE)COPPER(I) TETRAFLUOROBORATE 9.8 MILLICURIE: 1 INJECTION, POWDER, LYOPHILIZED, FOR SOLUTION INTRAVENOUS at 07:04

## 2025-08-27 RX ADMIN — PERFLUTREN 2 ML: 6.52 INJECTION, SUSPENSION INTRAVENOUS at 07:30

## 2025-08-27 RX ADMIN — REGADENOSON 0.4 MG: 0.08 INJECTION, SOLUTION INTRAVENOUS at 07:49

## 2025-08-27 RX ADMIN — TETRAKIS(2-METHOXYISOBUTYLISOCYANIDE)COPPER(I) TETRAFLUOROBORATE 28.8 MILLICURIE: 1 INJECTION, POWDER, LYOPHILIZED, FOR SOLUTION INTRAVENOUS at 07:54

## 2025-08-28 ENCOUNTER — TELEPHONE (OUTPATIENT)
Age: 80
End: 2025-08-28

## 2025-09-05 DIAGNOSIS — M54.50 LOWER BACK PAIN: Primary | ICD-10-CM

## 2025-09-05 DIAGNOSIS — R39.14 FEELING OF INCOMPLETE BLADDER EMPTYING: ICD-10-CM

## 2025-09-09 ENCOUNTER — APPOINTMENT (OUTPATIENT)
Dept: NEUROLOGY | Age: 80
End: 2025-09-09
Attending: INTERNAL MEDICINE

## 2025-09-11 ENCOUNTER — APPOINTMENT (OUTPATIENT)
Age: 80
End: 2025-09-11

## 2025-09-15 ENCOUNTER — APPOINTMENT (OUTPATIENT)
Age: 80
End: 2025-09-15

## (undated) DIAGNOSIS — Z00.00 PHYSICAL EXAM, ANNUAL: Primary | ICD-10-CM

## (undated) DIAGNOSIS — Z12.5 SCREENING PSA (PROSTATE SPECIFIC ANTIGEN): ICD-10-CM

## (undated) NOTE — LETTER
[address]   Date:   11/20/2019     Name:   Sergei Fernandez    YOB: 1945   MRN:   [id number]       WHERE IS YOUR PAIN NOW? Rony the areas on your body where you feel the described sensations.   Use the appropriat

## (undated) NOTE — LETTER
[address]   Date:   7/22/2019     Name:   Lenard Clayton    YOB: 1945   MRN:   [id number]       WHERE IS YOUR PAIN NOW? Rony the areas on your body where you feel the described sensations.   Use the appropriate

## (undated) NOTE — LETTER
[address]   Date:   12/20/2019     Name:   Zeus Guerrero    YOB: 1945   MRN:   [id number]       WHERE IS YOUR PAIN NOW? Rony the areas on your body where you feel the described sensations.   Use the appropriat

## (undated) NOTE — LETTER
[facility] SURGERY SCHEDULING FORM   [address] [facility]   [phone] (scheduling phone) [phone] (scheduling fax)     PATIENT INFORMATION   Last Name:      Raquel Nj      First Name:    Bearl Beverage Allergies: Patient has no known allergies.          Completed by:    uRdy Barnes      Date:    6/7/2019

## (undated) NOTE — LETTER
[address]   Date:   5/31/2019     Name:   Lolita Camacho    YOB: 1945   MRN:   [id number]       WHERE IS YOUR PAIN NOW? Rony the areas on your body where you feel the described sensations.   Use the appropriate

## (undated) NOTE — LETTER
[address] [address]   Date:   5/3/2021     Name:   Corrinne Pear    YOB: 1945   MRN:   [id number]       WHERE IS YOUR PAIN NOW? Rony the areas on your body where you feel the described sensations.

## (undated) NOTE — LETTER
[address]   Date:   6/4/2021     Name:   Azar Browne    YOB: 1945   MRN:   [id number]       WHERE IS YOUR PAIN NOW? Rony the areas on your body where you feel the described sensations.   Use the appropriate

## (undated) NOTE — Clinical Note
2017              [facility]        [address]        [facility] [address]        : 47       To Whom it May Concern:     Percy Rodríguez is a patient who receives medical care from this office.   He was last seen on November

## (undated) NOTE — LETTER
[address]   Date:   7/21/2020     Name:   Sergei Fernandez    YOB: 1945   MRN:   [id number]       WHERE IS YOUR PAIN NOW? Rony the areas on your body where you feel the described sensations.   Use the appropriate

## (undated) NOTE — LETTER
[facility] SURGERY SCHEDULING FORM   [address] [place] [address]   [phone] (scheduling phone) [phone] (scheduling fax)     PATIENT INFORMATION   Last Name:      Fozia Ga      First Name:    Tomas Man Allergies: Patient has no allergy information on record.          Completed by:    Heron Jones      Date:    10/15/2019

## (undated) NOTE — Clinical Note
Dear Mikie Portillo,    I had the opportunity to see your patient Martin Li recently. I am sending you this update, and I appreciate your confidence in me to care for your patients.  Please feel free call me with any questions at [phone] or contact me

## (undated) NOTE — Clinical Note
Dear Jailene Clark,    I had the opportunity to see your patient Autumn Freitas recently. I am sending you this update, and I appreciate your confidence in me to care for your patients.  Please feel free call me with any questions at [phone] or contact me

## (undated) NOTE — LETTER
[facility] SURGERY SCHEDULING FORM   [address] [place] [address] [facility]   [phone] (scheduling phone) [phone] (scheduling fax)     PATIENT INFORMATION   Last Name:      Vicki Bush      First Name:    Yelitza Abler Allergies: Patient has no known allergies.          Completed by:    Beckie Pool      Date:    7/21/2020

## (undated) NOTE — Clinical Note
2/24/2017              Dilia Peterson        [address] [facility] [address]         To Whom it May Concern:       Sincerely,    Paul Ansari MD  [facility], [address] [place] [address] Jaky Maier